# Patient Record
Sex: MALE | Race: BLACK OR AFRICAN AMERICAN | Employment: OTHER | URBAN - METROPOLITAN AREA
[De-identification: names, ages, dates, MRNs, and addresses within clinical notes are randomized per-mention and may not be internally consistent; named-entity substitution may affect disease eponyms.]

---

## 2017-08-16 ENCOUNTER — TELEPHONE (OUTPATIENT)
Dept: CARDIOLOGY CLINIC | Age: 57
End: 2017-08-16

## 2017-08-31 ENCOUNTER — OFFICE VISIT (OUTPATIENT)
Dept: CARDIOLOGY CLINIC | Age: 57
End: 2017-08-31

## 2017-08-31 VITALS
HEIGHT: 72 IN | HEART RATE: 85 BPM | OXYGEN SATURATION: 97 % | SYSTOLIC BLOOD PRESSURE: 148 MMHG | WEIGHT: 315 LBS | DIASTOLIC BLOOD PRESSURE: 94 MMHG | BODY MASS INDEX: 42.66 KG/M2

## 2017-08-31 DIAGNOSIS — I21.4 NSTEMI (NON-ST ELEVATED MYOCARDIAL INFARCTION) (HCC): Primary | ICD-10-CM

## 2017-08-31 DIAGNOSIS — I10 ESSENTIAL HYPERTENSION: ICD-10-CM

## 2017-08-31 DIAGNOSIS — I25.10 CAD IN NATIVE ARTERY: ICD-10-CM

## 2017-08-31 DIAGNOSIS — Z72.0 TOBACCO USE: ICD-10-CM

## 2017-08-31 DIAGNOSIS — E78.5 HYPERLIPIDEMIA LDL GOAL <70: ICD-10-CM

## 2017-08-31 PROCEDURE — 99214 OFFICE O/P EST MOD 30 MIN: CPT | Performed by: NURSE PRACTITIONER

## 2017-08-31 PROCEDURE — G8598 ASA/ANTIPLAT THER USED: HCPCS | Performed by: NURSE PRACTITIONER

## 2017-08-31 PROCEDURE — 3017F COLORECTAL CA SCREEN DOC REV: CPT | Performed by: NURSE PRACTITIONER

## 2017-08-31 PROCEDURE — 4004F PT TOBACCO SCREEN RCVD TLK: CPT | Performed by: NURSE PRACTITIONER

## 2017-08-31 PROCEDURE — G8417 CALC BMI ABV UP PARAM F/U: HCPCS | Performed by: NURSE PRACTITIONER

## 2017-08-31 PROCEDURE — G8427 DOCREV CUR MEDS BY ELIG CLIN: HCPCS | Performed by: NURSE PRACTITIONER

## 2017-08-31 PROCEDURE — 1111F DSCHRG MED/CURRENT MED MERGE: CPT | Performed by: NURSE PRACTITIONER

## 2017-09-01 ENCOUNTER — HOSPITAL ENCOUNTER (OUTPATIENT)
Dept: GENERAL RADIOLOGY | Age: 57
Discharge: HOME | End: 2017-10-12

## 2017-09-01 PROBLEM — I24.9 ACUTE CORONARY SYNDROME (HCC): Status: ACTIVE | Noted: 2017-09-01

## 2017-09-14 ENCOUNTER — TELEPHONE (OUTPATIENT)
Dept: CARDIOLOGY CLINIC | Age: 57
End: 2017-09-14

## 2017-09-15 RX ORDER — CLOPIDOGREL BISULFATE 75 MG/1
75 TABLET ORAL DAILY
Qty: 30 TABLET | Refills: 12 | Status: ON HOLD | OUTPATIENT
Start: 2017-09-15 | End: 2019-05-15

## 2017-10-11 ENCOUNTER — HOSPITAL ENCOUNTER (OUTPATIENT)
Dept: CARDIAC REHAB | Age: 57
Discharge: HOME | End: 2017-10-31
Attending: INTERNAL MEDICINE | Admitting: INTERNAL MEDICINE

## 2017-11-01 ENCOUNTER — HOSPITAL ENCOUNTER (OUTPATIENT)
Dept: OTHER | Age: 57
Discharge: HOME | End: 2017-11-30
Attending: INTERNAL MEDICINE | Admitting: INTERNAL MEDICINE

## 2019-04-02 ENCOUNTER — HOSPITAL ENCOUNTER (EMERGENCY)
Age: 59
Discharge: HOME | End: 2019-04-03
Attending: EMERGENCY MEDICINE
Payer: COMMERCIAL

## 2019-04-02 ENCOUNTER — APPOINTMENT (OUTPATIENT)
Dept: GENERAL RADIOLOGY | Age: 59
End: 2019-04-02
Payer: COMMERCIAL

## 2019-04-02 DIAGNOSIS — R07.9 CHEST PAIN, UNSPECIFIED TYPE: Primary | ICD-10-CM

## 2019-04-02 LAB
HCT VFR BLD CALC: 40.4 % (ref 40.5–52.5)
HEMOGLOBIN: 13.4 G/DL (ref 13.5–17.5)
MCH RBC QN AUTO: 29.1 PG (ref 26–34)
MCHC RBC AUTO-ENTMCNC: 33.1 G/DL (ref 31–36)
MCV RBC AUTO: 87.8 FL (ref 80–100)
PDW BLD-RTO: 15.9 % (ref 12.4–15.4)
PLATELET # BLD: 219 K/UL (ref 135–450)
PMV BLD AUTO: 9.2 FL (ref 5–10.5)
RBC # BLD: 4.6 M/UL (ref 4.2–5.9)
WBC # BLD: 11.1 K/UL (ref 4–11)

## 2019-04-02 PROCEDURE — 93005 ELECTROCARDIOGRAM TRACING: CPT | Performed by: EMERGENCY MEDICINE

## 2019-04-02 PROCEDURE — 85027 COMPLETE CBC AUTOMATED: CPT

## 2019-04-02 PROCEDURE — 83880 ASSAY OF NATRIURETIC PEPTIDE: CPT

## 2019-04-02 PROCEDURE — 36415 COLL VENOUS BLD VENIPUNCTURE: CPT

## 2019-04-02 PROCEDURE — 99285 EMERGENCY DEPT VISIT HI MDM: CPT

## 2019-04-02 PROCEDURE — 84484 ASSAY OF TROPONIN QUANT: CPT

## 2019-04-02 PROCEDURE — 71046 X-RAY EXAM CHEST 2 VIEWS: CPT

## 2019-04-02 PROCEDURE — 80053 COMPREHEN METABOLIC PANEL: CPT

## 2019-04-02 RX ORDER — NITROGLYCERIN 0.4 MG/1
0.4 TABLET SUBLINGUAL EVERY 5 MIN PRN
Status: DISCONTINUED | OUTPATIENT
Start: 2019-04-02 | End: 2019-04-03 | Stop reason: HOSPADM

## 2019-04-02 ASSESSMENT — PAIN SCALES - GENERAL: PAINLEVEL_OUTOF10: 6

## 2019-04-02 ASSESSMENT — PAIN DESCRIPTION - FREQUENCY: FREQUENCY: INTERMITTENT

## 2019-04-02 ASSESSMENT — PAIN DESCRIPTION - ORIENTATION: ORIENTATION: MID

## 2019-04-02 ASSESSMENT — PAIN DESCRIPTION - PAIN TYPE: TYPE: ACUTE PAIN

## 2019-04-02 ASSESSMENT — PAIN DESCRIPTION - DESCRIPTORS: DESCRIPTORS: PRESSURE

## 2019-04-02 ASSESSMENT — PAIN DESCRIPTION - LOCATION: LOCATION: CHEST

## 2019-04-03 VITALS
BODY MASS INDEX: 35.98 KG/M2 | TEMPERATURE: 99.7 F | HEIGHT: 72 IN | OXYGEN SATURATION: 95 % | WEIGHT: 265.65 LBS | DIASTOLIC BLOOD PRESSURE: 100 MMHG | RESPIRATION RATE: 21 BRPM | SYSTOLIC BLOOD PRESSURE: 187 MMHG | HEART RATE: 83 BPM

## 2019-04-03 LAB
A/G RATIO: 1.4 (ref 1.1–2.2)
ALBUMIN SERPL-MCNC: 3.6 G/DL (ref 3.4–5)
ALP BLD-CCNC: 62 U/L (ref 40–129)
ALT SERPL-CCNC: 10 U/L (ref 10–40)
ANION GAP SERPL CALCULATED.3IONS-SCNC: 12 MMOL/L (ref 3–16)
AST SERPL-CCNC: 9 U/L (ref 15–37)
BILIRUB SERPL-MCNC: <0.2 MG/DL (ref 0–1)
BUN BLDV-MCNC: 14 MG/DL (ref 7–20)
CALCIUM SERPL-MCNC: 8.1 MG/DL (ref 8.3–10.6)
CHLORIDE BLD-SCNC: 103 MMOL/L (ref 99–110)
CO2: 21 MMOL/L (ref 21–32)
CREAT SERPL-MCNC: 0.8 MG/DL (ref 0.9–1.3)
EKG ATRIAL RATE: 61 BPM
EKG ATRIAL RATE: 79 BPM
EKG DIAGNOSIS: NORMAL
EKG DIAGNOSIS: NORMAL
EKG P AXIS: 47 DEGREES
EKG P AXIS: 59 DEGREES
EKG P-R INTERVAL: 152 MS
EKG P-R INTERVAL: 160 MS
EKG Q-T INTERVAL: 386 MS
EKG Q-T INTERVAL: 434 MS
EKG QRS DURATION: 74 MS
EKG QRS DURATION: 80 MS
EKG QTC CALCULATION (BAZETT): 436 MS
EKG QTC CALCULATION (BAZETT): 442 MS
EKG R AXIS: -14 DEGREES
EKG R AXIS: -29 DEGREES
EKG T AXIS: 28 DEGREES
EKG T AXIS: 74 DEGREES
EKG VENTRICULAR RATE: 61 BPM
EKG VENTRICULAR RATE: 79 BPM
GFR AFRICAN AMERICAN: >60
GFR NON-AFRICAN AMERICAN: >60
GLOBULIN: 2.6 G/DL
GLUCOSE BLD-MCNC: 266 MG/DL (ref 70–99)
POTASSIUM REFLEX MAGNESIUM: 3.9 MMOL/L (ref 3.5–5.1)
PRO-BNP: 1436 PG/ML (ref 0–124)
SODIUM BLD-SCNC: 136 MMOL/L (ref 136–145)
TOTAL PROTEIN: 6.2 G/DL (ref 6.4–8.2)
TROPONIN: <0.01 NG/ML
TROPONIN: <0.01 NG/ML

## 2019-04-03 PROCEDURE — 93010 ELECTROCARDIOGRAM REPORT: CPT | Performed by: INTERNAL MEDICINE

## 2019-04-03 PROCEDURE — 6370000000 HC RX 637 (ALT 250 FOR IP): Performed by: EMERGENCY MEDICINE

## 2019-04-03 PROCEDURE — 93005 ELECTROCARDIOGRAM TRACING: CPT | Performed by: EMERGENCY MEDICINE

## 2019-04-03 PROCEDURE — 36415 COLL VENOUS BLD VENIPUNCTURE: CPT

## 2019-04-03 PROCEDURE — 84484 ASSAY OF TROPONIN QUANT: CPT

## 2019-04-03 RX ORDER — HYDROCHLOROTHIAZIDE 12.5 MG/1
25 CAPSULE, GELATIN COATED ORAL ONCE
Status: COMPLETED | OUTPATIENT
Start: 2019-04-03 | End: 2019-04-03

## 2019-04-03 RX ORDER — LISINOPRIL 10 MG/1
40 TABLET ORAL ONCE
Status: COMPLETED | OUTPATIENT
Start: 2019-04-03 | End: 2019-04-03

## 2019-04-03 RX ORDER — ISOSORBIDE MONONITRATE 60 MG/1
60 TABLET, EXTENDED RELEASE ORAL ONCE
Status: DISCONTINUED | OUTPATIENT
Start: 2019-04-03 | End: 2019-04-03 | Stop reason: HOSPADM

## 2019-04-03 RX ADMIN — LISINOPRIL 40 MG: 10 TABLET ORAL at 03:44

## 2019-04-03 RX ADMIN — HYDROCHLOROTHIAZIDE 25 MG: 12.5 CAPSULE ORAL at 03:44

## 2019-04-03 ASSESSMENT — HEART SCORE: ECG: 0

## 2019-04-03 ASSESSMENT — PAIN SCALES - GENERAL: PAINLEVEL_OUTOF10: 0

## 2019-04-03 NOTE — ED PROVIDER NOTES
x-ray does not show any pulmonary edema other acute processes. EKG is negative for acute ST segment elevation. She will be kept in the emergency room for repeat of his troponin and EKG. Except on cardiac and blood pressure monitoring. The patient has a run of PVCs which is brief. He is asymptomatic. Repeat troponin and EKG are unremarkable. The patient remains asymptomatic here in the emergency room. The patient will be discharged home at this time. A myocardial stress test is ordered as well patient counseled on the importance of calling today to have this procedure done. Follow-up with [facility] is also given. The patient's blood pressure keeps increasing since his been here in the emergency room he is given his daily dose of antihypertensives this morning in the emergency room prior to discharge. The patient is informed that he should not take his blood pressure medications again for today. Expresses understanding of the instructions he is able to verbalize the instructions back to me. CRITICAL CARE TIME   None       CONSULTS:  None    PROCEDURES:  Unless otherwise noted above, none     Procedures    FINAL IMPRESSION      1. Chest pain, unspecified type          DISPOSITION/PLAN   DISPOSITION        PATIENT REFERREDTO:  [facility] PREETI Me[address]ia[address] [place]  Schedule an appointment as soon as possible for a visit today        DISCHARGEMEDICATIONS:  New Prescriptions    No medications on file          (Please note that portions of this note were completed with a voice recognition program.  Efforts were made to edit the dictations but occasionally words are mis-transcribed.)    Adolfo Moran MD (electronically signed)  Attending Emergency Physician        Adolfo Moran MD  04/03/19 [id number]

## 2019-04-03 NOTE — ED NOTES
Pt states chest pain has resolved. Order for [facility] in place, in case pain returns.       Nico Goldman RN  04/03/19 9993

## 2019-04-03 NOTE — ED TRIAGE NOTES
Pt presents to the ED with c/o chest pain/pressure that started about 9 pm. Pt states he was sitting on the couch when the pain started. Says, \"It feels like it's deep in my chest\". Denies any other complaints. Vital signs are stable. EKG completed.

## 2019-04-09 ENCOUNTER — HOSPITAL ENCOUNTER (EMERGENCY)
Age: 59
Discharge: HOME | End: 2019-04-09
Payer: COMMERCIAL

## 2019-04-09 VITALS
HEIGHT: 72 IN | OXYGEN SATURATION: 100 % | WEIGHT: 266.76 LBS | HEART RATE: 70 BPM | TEMPERATURE: 98.5 F | DIASTOLIC BLOOD PRESSURE: 77 MMHG | SYSTOLIC BLOOD PRESSURE: 169 MMHG | RESPIRATION RATE: 18 BRPM | BODY MASS INDEX: 36.13 KG/M2

## 2019-04-09 DIAGNOSIS — G89.29 ACUTE EXACERBATION OF CHRONIC LOW BACK PAIN: Primary | ICD-10-CM

## 2019-04-09 DIAGNOSIS — M54.50 ACUTE EXACERBATION OF CHRONIC LOW BACK PAIN: Primary | ICD-10-CM

## 2019-04-09 PROCEDURE — 96372 THER/PROPH/DIAG INJ SC/IM: CPT

## 2019-04-09 PROCEDURE — 99283 EMERGENCY DEPT VISIT LOW MDM: CPT

## 2019-04-09 PROCEDURE — 6370000000 HC RX 637 (ALT 250 FOR IP): Performed by: NURSE PRACTITIONER

## 2019-04-09 PROCEDURE — 6360000002 HC RX W HCPCS: Performed by: NURSE PRACTITIONER

## 2019-04-09 RX ORDER — MORPHINE SULFATE 10 MG/ML
6 INJECTION, SOLUTION INTRAMUSCULAR; INTRAVENOUS ONCE
Status: COMPLETED | OUTPATIENT
Start: 2019-04-09 | End: 2019-04-09

## 2019-04-09 RX ORDER — METHOCARBAMOL 500 MG/1
500 TABLET, FILM COATED ORAL 4 TIMES DAILY
Qty: 40 TABLET | Refills: 0 | Status: SHIPPED | OUTPATIENT
Start: 2019-04-09 | End: 2019-04-19

## 2019-04-09 RX ORDER — ORPHENADRINE CITRATE 30 MG/ML
60 INJECTION INTRAMUSCULAR; INTRAVENOUS ONCE
Status: DISCONTINUED | OUTPATIENT
Start: 2019-04-09 | End: 2019-04-09

## 2019-04-09 RX ORDER — METHOCARBAMOL 750 MG/1
750 TABLET, FILM COATED ORAL ONCE
Status: COMPLETED | OUTPATIENT
Start: 2019-04-09 | End: 2019-04-09

## 2019-04-09 RX ORDER — HYDROCODONE BITARTRATE AND ACETAMINOPHEN 5; 325 MG/1; MG/1
1 TABLET ORAL EVERY 8 HOURS PRN
Qty: 6 TABLET | Refills: 0 | Status: SHIPPED | OUTPATIENT
Start: 2019-04-09 | End: 2019-04-11

## 2019-04-09 RX ORDER — LIDOCAINE 50 MG/G
1 PATCH TOPICAL DAILY
Qty: 30 PATCH | Refills: 0 | Status: SHIPPED | OUTPATIENT
Start: 2019-04-09 | End: 2019-10-03

## 2019-04-09 RX ADMIN — MORPHINE SULFATE 6 MG: 10 INJECTION INTRAVENOUS at 21:15

## 2019-04-09 RX ADMIN — METHOCARBAMOL TABLETS 750 MG: 750 TABLET, COATED ORAL at 21:14

## 2019-04-09 ASSESSMENT — PAIN DESCRIPTION - LOCATION
LOCATION: BACK
LOCATION: BACK

## 2019-04-09 ASSESSMENT — ENCOUNTER SYMPTOMS
CHOKING: 0
RHINORRHEA: 0
VOMITING: 0
ABDOMINAL DISTENTION: 0
COUGH: 0
SHORTNESS OF BREATH: 0
COLOR CHANGE: 0
BACK PAIN: 1
CHEST TIGHTNESS: 0
NAUSEA: 0
DIARRHEA: 0

## 2019-04-09 ASSESSMENT — PAIN SCALES - GENERAL
PAINLEVEL_OUTOF10: 4
PAINLEVEL_OUTOF10: 10
PAINLEVEL_OUTOF10: 6

## 2019-04-09 ASSESSMENT — PAIN DESCRIPTION - PAIN TYPE
TYPE: ACUTE PAIN

## 2019-04-09 ASSESSMENT — PAIN DESCRIPTION - ORIENTATION: ORIENTATION: LOWER

## 2019-04-09 ASSESSMENT — PAIN DESCRIPTION - ONSET: ONSET: PROGRESSIVE

## 2019-04-09 ASSESSMENT — PAIN DESCRIPTION - DESCRIPTORS: DESCRIPTORS: SHARP

## 2019-04-10 NOTE — ED NOTES
Pt's daughter notified to  patient, due to narcotic medication administration.      Shelby Miner RN  04/09/19 5659

## 2019-04-10 NOTE — ED TRIAGE NOTES
Pt presents to the ED with c/o sharp lower back pain, that radiates down both legs. Has a history of L5 disk herniation. Pt took Tyelnol, Lyrica, and Gabapentin for pain, which was ineffective. Pt is in tears currently, rating pain a 10/10.

## 2019-04-10 NOTE — ED PROVIDER NOTES
[address] [facility]  eMERGENCY dEPARTMENT eNCOUnter    Pt Name: @@  MRN: [id number]  Birthdate1960  Date of evaluation: 4/9/2019  Provider: ARIEL Richards CNP     Evaluated by the advanced practice provider    [address] [place] [address]       Chief Complaint   Patient presents with    Back Pain     c/o sharp, back pain starting this evening. States pain radiates down both legs         HISTORY OF PRESENT ILLNESS  (Location/Symptom, Timing/Onset, Context/Setting, Quality, Duration, Modifying Factors, Severity.)   Judy López is a 60 y.o. male who presents to the emergencydepartment for low back pain with a history of L5 disc herniation. He states it began this evening while he was sitting watching tv and reached for a remote, he felt a sharp pain in his back that has radiated down the back of bilateral knees. He describes the leg pain as constant, and back pain as sharp, stabbing. Patient states he took gabapentin, lyrica, and tylenol with no relief of symptoms. He states he has had similar pain 8 months ago when the \"disc herniation flared up\" and states it resolved without medical intervention with rest.  Patient states he has seen an orthopedic doctor at [facility] and previously received cortisone injections in his back, the last being over 1 year ago. PMH includes L5 disc herniation, OA of back, diabetes, HTN, and previous CAD requiring stent placement. Patient denies injury, urinary or bowel changes, numbness, or tingling in his lower extremities, chest pain, palpitations, SOB, and cough. Nursing Notes were reviewed and I agree. REVIEW OF SYSTEMS    (2-9 systems for level 4, 10 or more for level 5)     Review of Systems   Constitutional: Positive for activity change. Negative for chills, fatigue and fever. HENT: Negative for ear pain, hearing loss and rhinorrhea. Eyes: Negative for visual disturbance.    Respiratory: Negative for cough, choking, chest tightness and shortness of breath. Cardiovascular: Negative for chest pain and palpitations. Gastrointestinal: Negative for abdominal distention, diarrhea, nausea and vomiting. Endocrine: Negative for polyuria. Genitourinary: Negative for dysuria, flank pain, hematuria and urgency. Musculoskeletal: Positive for arthralgias, back pain and gait problem. Negative for joint swelling, neck pain and neck stiffness. Skin: Negative for color change. Neurological: Negative for dizziness, weakness and headaches. Except as noted above the remainder of the review of systems was reviewed and negative. PAST MEDICAL HISTORY         Diagnosis Date    Arthritis     CAD (coronary artery disease)     MI in 8/2017; BELIA to LCX; 70% residual mid-distal LCX    Chronic pain     Diabetes     Herniated disc     Hyperlipidemia     Hypertension     Morbid obesity ([address]     NSTEMI (non-ST elevated myocardial infarction) ([address]     Protein in urine 2016    Sleep apnea     Type 2 diabetes mellitus without complication ([address]     Wears glasses        SURGICAL HISTORY           Procedure Laterality Date    CORONARY ANGIOPLASTY WITH STENT PLACEMENT  08/2017    BELIA to LCX    EYE SURGERY Bilateral     eye muscle--done as a child    KNEE SURGERY Bilateral     patella repair r/t work related accident       CURRENT MEDICATIONS       Previous Medications    CLOPIDOGREL (PLAVIX) 75 MG TABLET    Take 1 tablet by mouth daily    GLIMEPIRIDE (AMARYL) 2 MG TABLET    Take 2 mg by mouth Daily with supper    GLIPIZIDE (GLUCOTROL) 10 MG TABLET    Take 10 mg by mouth 2 times daily (before meals)    HYDROCHLOROTHIAZIDE (HYDRODIURIL) 12.5 MG TABLET    Take 1 tablet by mouth daily    ISOSORBIDE MONONITRATE (IMDUR) 60 MG EXTENDED RELEASE TABLET    Take 1 tablet by mouth daily    LISINOPRIL (PRINIVIL;ZESTRIL) 40 MG TABLET    Take 40 mg by mouth daily.     METFORMIN (GLUCOPHAGE) 500 MG TABLET    Take 1,000 mg by mouth 2 times daily (with meals) thoracic lumbar spine midline. Elicited pain with palpation in the lumbar spine midline. There is no evidence of swelling, scars, step-offs or crepitance. Pelvic girdle stable. Bilateral lower extremity symmetrical size shape color. Full sensation bilaterally extremities. Patient is able to perform bilateral flexion and extension of the knees and perform bilateral plantar flexion dorsiflexion was strength 5 out of 5 and equal. Bilateral femoral and dorsalis pedis pulses. Good muscle tone. Observe patient ambulate with walker and there is no foot drop and no leg leg. Patient has pain along the left SI region and lumbar paraspinous region. No swelling. Mildly positive left straight leg raise. Neurological: He is alert and oriented to person, place, and time. No cranial nerve deficit or sensory deficit. He exhibits normal muscle tone. Coordination and gait normal.   Reflex Scores:       Patellar reflexes are 2+ on the right side and 2+ on the left side. Achilles reflexes are 2+ on the right side and 2+ on the left side. Skin: Skin is warm and dry. Capillary refill takes less than 2 seconds. He is not diaphoretic. Nursing note and vitals reviewed. DIAGNOSTIC RESULTS     RADIOLOGY:   Non-plain film images such as CT, Ultrasound and MRI are read by the radiologist. Plain radiographic images are visualized and preliminarily interpreted by ARIEL Catalan CNP with the below findings:        Interpretation per the Radiologist below, if available at the time of this note:    No orders to display       LABS:  Labs Reviewed - No data to display    All other labs were within normal range or not returned as of this dictation.     EMERGENCY DEPARTMENT COURSE and DIFFERENTIAL DIAGNOSIS/MDM:   Vitals:    Vitals:    04/09/19 2043   BP: (!) 181/96   Pulse: 76   Resp: 22   Temp: 98.7 °F (37.1 °C)   TempSrc: Oral   SpO2: 100%   Weight: 266 lb 12.1 oz (121 kg)   Height: 6' (1.829 m) MDM    Medications   morphine injection 6 mg (6 mg Intramuscular Given 4/9/19 2115)   methocarbamol (ROBAXIN) tablet 750 mg (750 mg Oral Given 4/9/19 2114)     Patient Was seen and evaluated per myself and PA sofi Hernandez Presume coerce. He has presented ambulatory to the emergency department with no direct trauma. Reporting an exacerbation of midline low back pain after bending over on the couch pick something up. He does ambulate consistently with a walker at home. He is able to do that this evening in the emergency department. Despite being a diabetic he has had no recent procedures. Last spine injection was about a year ago. There is no evidence of cauda equina. I below index of suspicion for discitis given the acute onset nature of his pain this evening. There is no evidence of neurological deficit. No indication that would warrant an emergent MRI or CAT scan. Patient will be treated for pain here in the emergency department. He'll be given prescriptions for Lidoderm. Given his concurrent medical history an antihypertensive therapy NSAIDs should be avoided. He cannot receive steroids due to his diabetic condition. Therefore he will be given muscle relaxants and Lidoderm patches and I will also give him 2 days' worth of narcotic pain medication. [place]AR[place] report reviewed patient has a 000 overdose risk score, he said 3 prescriptions in the past year 3 pharmacies and 3 providers. No red flags. Electronic record indicates MRI of the lumbar spine in 2016 indicates severe degenerative disc changes at L5-S1 with moderate right paracentral central disc herniation at L5-S1, small left paracentral focal disc protrusion at L4-L5. Patient will be discharged home below listed prescriptions with a referral back to follow-up with Dr. Tomeka Vale listed in his record with him he is seen in the past for epidural injections and spine pain management.  He can also follow up with his primary care physician as needed. PROCEDURES:  Procedures    FINAL IMPRESSION      1. Acute exacerbation of chronic low back pain          DISPOSITION/PLAN   DISPOSITION        PATIENT REFERRED TO:  Harsha Galvez MD    Schedule an appointment as soon as possible for a visit       Stefani Mcpherson MD  [address]     Schedule an appointment as soon as possible for a visit   Spine, pain management referral: This is who is documented in her record that you have seen in the past for your spine related issues      DISCHARGE MEDICATIONS:  New Prescriptions    HYDROCODONE-ACETAMINOPHEN (NORCO) 5-325 MG PER TABLET    Take 1 tablet by mouth every 8 hours as needed for Pain for up to 2 days. Sedation precautions please    LIDOCAINE (LIDODERM) 5 %    Place 1 patch onto the skin daily 12 hours on, 12 hours off.     METHOCARBAMOL (ROBAXIN) 500 MG TABLET    Take 1 tablet by mouth 4 times daily for 10 days       (Please note that portions of this note werecompleted with a voice recognition program.  Efforts were made to edit the dictations but occasionally words are mis-transcribed.)    Anitra Brasher, [facility], APRN - CNP  04/09/19 [id number]

## 2019-04-12 ENCOUNTER — APPOINTMENT (OUTPATIENT)
Dept: CT IMAGING | Age: 59
End: 2019-04-12
Payer: COMMERCIAL

## 2019-04-12 ENCOUNTER — HOSPITAL ENCOUNTER (EMERGENCY)
Age: 59
Discharge: HOME | End: 2019-04-12
Attending: EMERGENCY MEDICINE
Payer: COMMERCIAL

## 2019-04-12 VITALS
SYSTOLIC BLOOD PRESSURE: 172 MMHG | HEART RATE: 76 BPM | WEIGHT: 266 LBS | DIASTOLIC BLOOD PRESSURE: 95 MMHG | RESPIRATION RATE: 18 BRPM | HEIGHT: 72 IN | BODY MASS INDEX: 36.03 KG/M2 | TEMPERATURE: 98.4 F | OXYGEN SATURATION: 95 %

## 2019-04-12 DIAGNOSIS — M54.50 ACUTE EXACERBATION OF CHRONIC LOW BACK PAIN: Primary | ICD-10-CM

## 2019-04-12 DIAGNOSIS — G89.29 ACUTE EXACERBATION OF CHRONIC LOW BACK PAIN: Primary | ICD-10-CM

## 2019-04-12 LAB
ANION GAP SERPL CALCULATED.3IONS-SCNC: 11 MMOL/L (ref 3–16)
BASOPHILS ABSOLUTE: 0.1 K/UL (ref 0–0.2)
BASOPHILS RELATIVE PERCENT: 1.4 %
BILIRUBIN URINE: NEGATIVE
BLOOD, URINE: NEGATIVE
BUN BLDV-MCNC: 15 MG/DL (ref 7–20)
CALCIUM SERPL-MCNC: 9.1 MG/DL (ref 8.3–10.6)
CHLORIDE BLD-SCNC: 103 MMOL/L (ref 99–110)
CLARITY: CLEAR
CO2: 25 MMOL/L (ref 21–32)
COLOR: YELLOW
CREAT SERPL-MCNC: 1 MG/DL (ref 0.9–1.3)
EOSINOPHILS ABSOLUTE: 0.1 K/UL (ref 0–0.6)
EOSINOPHILS RELATIVE PERCENT: 1.4 %
EPITHELIAL CELLS, UA: 0 /HPF (ref 0–5)
GFR AFRICAN AMERICAN: >60
GFR NON-AFRICAN AMERICAN: >60
GLUCOSE BLD-MCNC: 225 MG/DL (ref 70–99)
GLUCOSE URINE: 250 MG/DL
HCT VFR BLD CALC: 44.6 % (ref 40.5–52.5)
HEMOGLOBIN: 14.9 G/DL (ref 13.5–17.5)
HYALINE CASTS: 1 /LPF (ref 0–8)
KETONES, URINE: NEGATIVE MG/DL
LEUKOCYTE ESTERASE, URINE: NEGATIVE
LYMPHOCYTES ABSOLUTE: 3.3 K/UL (ref 1–5.1)
LYMPHOCYTES RELATIVE PERCENT: 31.1 %
MCH RBC QN AUTO: 29.3 PG (ref 26–34)
MCHC RBC AUTO-ENTMCNC: 33.5 G/DL (ref 31–36)
MCV RBC AUTO: 87.4 FL (ref 80–100)
MICROSCOPIC EXAMINATION: YES
MONOCYTES ABSOLUTE: 0.9 K/UL (ref 0–1.3)
MONOCYTES RELATIVE PERCENT: 8.8 %
NEUTROPHILS ABSOLUTE: 6 K/UL (ref 1.7–7.7)
NEUTROPHILS RELATIVE PERCENT: 57.3 %
NITRITE, URINE: NEGATIVE
PDW BLD-RTO: 16 % (ref 12.4–15.4)
PH UA: 5.5 (ref 5–8)
PLATELET # BLD: 224 K/UL (ref 135–450)
PMV BLD AUTO: 9.3 FL (ref 5–10.5)
POTASSIUM REFLEX MAGNESIUM: 3.9 MMOL/L (ref 3.5–5.1)
PROTEIN UA: 30 MG/DL
RBC # BLD: 5.1 M/UL (ref 4.2–5.9)
RBC UA: 1 /HPF (ref 0–4)
SODIUM BLD-SCNC: 139 MMOL/L (ref 136–145)
SPECIFIC GRAVITY UA: 1.02 (ref 1–1.03)
URINE REFLEX TO CULTURE: ABNORMAL
URINE TYPE: ABNORMAL
UROBILINOGEN, URINE: 1 E.U./DL
WBC # BLD: 10.5 K/UL (ref 4–11)
WBC UA: 0 /HPF (ref 0–5)

## 2019-04-12 PROCEDURE — 6360000002 HC RX W HCPCS: Performed by: EMERGENCY MEDICINE

## 2019-04-12 PROCEDURE — 96375 TX/PRO/DX INJ NEW DRUG ADDON: CPT

## 2019-04-12 PROCEDURE — 85025 COMPLETE CBC W/AUTO DIFF WBC: CPT

## 2019-04-12 PROCEDURE — 74177 CT ABD & PELVIS W/CONTRAST: CPT

## 2019-04-12 PROCEDURE — 6370000000 HC RX 637 (ALT 250 FOR IP): Performed by: EMERGENCY MEDICINE

## 2019-04-12 PROCEDURE — 99284 EMERGENCY DEPT VISIT MOD MDM: CPT

## 2019-04-12 PROCEDURE — 81001 URINALYSIS AUTO W/SCOPE: CPT

## 2019-04-12 PROCEDURE — 80048 BASIC METABOLIC PNL TOTAL CA: CPT

## 2019-04-12 PROCEDURE — 96374 THER/PROPH/DIAG INJ IV PUSH: CPT

## 2019-04-12 PROCEDURE — 6360000004 HC RX CONTRAST MEDICATION: Performed by: EMERGENCY MEDICINE

## 2019-04-12 PROCEDURE — 72131 CT LUMBAR SPINE W/O DYE: CPT

## 2019-04-12 RX ORDER — PREDNISONE 20 MG/1
40 TABLET ORAL DAILY
Qty: 6 TABLET | Refills: 0 | Status: SHIPPED | OUTPATIENT
Start: 2019-04-12 | End: 2019-04-15

## 2019-04-12 RX ORDER — KETOROLAC TROMETHAMINE 30 MG/ML
15 INJECTION, SOLUTION INTRAMUSCULAR; INTRAVENOUS ONCE
Status: COMPLETED | OUTPATIENT
Start: 2019-04-12 | End: 2019-04-12

## 2019-04-12 RX ORDER — OXYCODONE HYDROCHLORIDE AND ACETAMINOPHEN 5; 325 MG/1; MG/1
1 TABLET ORAL EVERY 6 HOURS PRN
Qty: 20 TABLET | Refills: 0 | Status: SHIPPED | OUTPATIENT
Start: 2019-04-12 | End: 2019-04-17

## 2019-04-12 RX ORDER — METHYLPREDNISOLONE SODIUM SUCCINATE 125 MG/2ML
125 INJECTION, POWDER, LYOPHILIZED, FOR SOLUTION INTRAMUSCULAR; INTRAVENOUS ONCE
Status: COMPLETED | OUTPATIENT
Start: 2019-04-12 | End: 2019-04-12

## 2019-04-12 RX ORDER — OXYCODONE HYDROCHLORIDE AND ACETAMINOPHEN 5; 325 MG/1; MG/1
1 TABLET ORAL ONCE
Status: COMPLETED | OUTPATIENT
Start: 2019-04-12 | End: 2019-04-12

## 2019-04-12 RX ADMIN — IOPAMIDOL 75 ML: 755 INJECTION, SOLUTION INTRAVENOUS at 02:48

## 2019-04-12 RX ADMIN — KETOROLAC TROMETHAMINE 15 MG: 30 INJECTION, SOLUTION INTRAMUSCULAR; INTRAVENOUS at 01:35

## 2019-04-12 RX ADMIN — METHYLPREDNISOLONE SODIUM SUCCINATE 125 MG: 125 INJECTION, POWDER, FOR SOLUTION INTRAMUSCULAR; INTRAVENOUS at 04:08

## 2019-04-12 RX ADMIN — OXYCODONE HYDROCHLORIDE AND ACETAMINOPHEN 1 TABLET: 5; 325 TABLET ORAL at 01:35

## 2019-04-12 ASSESSMENT — PAIN DESCRIPTION - PAIN TYPE
TYPE: ACUTE PAIN
TYPE: ACUTE PAIN

## 2019-04-12 ASSESSMENT — ENCOUNTER SYMPTOMS
SORE THROAT: 0
COUGH: 0
NAUSEA: 0
EYE DISCHARGE: 0
EYE REDNESS: 0
VOMITING: 0
ABDOMINAL PAIN: 0
RHINORRHEA: 0
BACK PAIN: 1
WHEEZING: 0
SHORTNESS OF BREATH: 0
DIARRHEA: 0
EYE PAIN: 0

## 2019-04-12 ASSESSMENT — PAIN DESCRIPTION - DESCRIPTORS: DESCRIPTORS: SHARP;SHOOTING

## 2019-04-12 ASSESSMENT — PAIN SCALES - GENERAL
PAINLEVEL_OUTOF10: 9
PAINLEVEL_OUTOF10: 9
PAINLEVEL_OUTOF10: 5

## 2019-04-12 ASSESSMENT — PAIN - FUNCTIONAL ASSESSMENT: PAIN_FUNCTIONAL_ASSESSMENT: PREVENTS OR INTERFERES SOME ACTIVE ACTIVITIES AND ADLS

## 2019-04-12 ASSESSMENT — PAIN DESCRIPTION - FREQUENCY: FREQUENCY: CONTINUOUS

## 2019-04-12 ASSESSMENT — PAIN DESCRIPTION - PROGRESSION: CLINICAL_PROGRESSION: RAPIDLY WORSENING

## 2019-04-12 ASSESSMENT — PAIN DESCRIPTION - ONSET: ONSET: PROGRESSIVE

## 2019-04-12 ASSESSMENT — PAIN DESCRIPTION - LOCATION
LOCATION: BACK
LOCATION: BACK

## 2019-04-12 ASSESSMENT — PAIN DESCRIPTION - ORIENTATION
ORIENTATION: LOWER
ORIENTATION: LOWER

## 2019-04-12 NOTE — ED TRIAGE NOTES
Pt c/o exacerbation of lower back pain after reaching for a TV remote while in a sitting position. He has taken his prescribed gabapentin without symptom relief.  He reports that the pain radiates into both of his legs

## 2019-04-12 NOTE — ED NOTES
Missed IV attempt x3.  Charge nurse notified and she will be in to place IV access      Yu Enrique RN  04/12/19 7784

## 2019-04-12 NOTE — ED PROVIDER NOTES
[facility]  eMERGENCY dEPARTMENT eNCOUnter        Pt Name: Loren Juárez  MRN: [id number]  [place] 1960  Date of evaluation: 4/12/2019  Provider: Nasrin Cortes MD  PCP: Ilia Ngo MD      [address] [place] [address]       Chief Complaint   Patient presents with    Back Pain     lower back started today       HISTORY OFPRESENT ILLNESS   (Location/Symptom, Timing/Onset, Context/Setting, Quality, Duration, Modifying Factors,Severity)  Note limiting factors. Loren Juárez is a 60 y.o. male       Location/Symptom: Low back pain  Timing/Onset: Patient does have a history of chronic back problems and disc problems. He has dealt with this for years. Although he has chronic pain he really is not on narcotics on any kind of regular basis. Context/Setting: He was seen at the [address] [place] [address] emergency department 3 days ago. While there he was given 6 mg of morphine. He states it made him feel horrible. Made him too sleepy and feel like a drug addict and that he does not want that again. They gave him lidocaine patches but his insurance would not cover it. He was given 6 tablets of Norco which he has used up. That helped but not quite enough. It has gotten much worse today. He normally walks with a cane. He is also diabetic. He does suffer from episodes of urinary incontinence but this is somewhat of a chronic issue. Quality: Sharp  Duration: Constant  Modifying Factors: This pain gets worse when he lies down. Severity: 9 out of 10    Nursing Noteswere all reviewed and agreed with or any disagreements were addressed  in the HPI. REVIEW OF SYSTEMS    (2-9 systems for level 4, 10 or more for level 5)     Review of Systems   Constitutional: Negative for chills, fatigue and fever. HENT: Negative for ear pain, rhinorrhea and sore throat. Eyes: Negative for pain, discharge, redness and visual disturbance.    Respiratory: Negative for cough, shortness of breath and wheezing. Cardiovascular: Negative for chest pain, palpitations and leg swelling. Gastrointestinal: Negative for abdominal pain, diarrhea, nausea and vomiting. Genitourinary: Negative for difficulty urinating and dysuria. Musculoskeletal: Positive for back pain and gait problem. Negative for arthralgias and myalgias. Skin: Negative for rash. Allergic/Immunologic: Negative for environmental allergies. Neurological: Negative for dizziness, seizures, syncope and headaches. Hematological: Negative for adenopathy. Psychiatric/Behavioral: Negative for suicidal ideas. The patient is not nervous/anxious. PAST MEDICAL HISTORY     Past Medical History:   Diagnosis Date    Arthritis     CAD (coronary artery disease)     MI in 8/2017; BELIA to LCX; 70% residual mid-distal LCX    Chronic pain     Diabetes     Herniated disc     Hyperlipidemia     Hypertension     Morbid obesity ([address]     NSTEMI (non-ST elevated myocardial infarction) ([address]     Protein in urine 2016    Sleep apnea     Type 2 diabetes mellitus without complication ([address]     Wears glasses          SURGICAL HISTORY     Past Surgical History:   Procedure Laterality Date    CORONARY ANGIOPLASTY WITH STENT PLACEMENT  08/2017    BELIA to LCX    EYE SURGERY Bilateral     eye muscle--done as a child    KNEE SURGERY Bilateral     patella repair r/t work related accident         CURRENTMEDICATIONS       Previous Medications    CLOPIDOGREL (PLAVIX) 75 MG TABLET    Take 1 tablet by mouth daily    GLIMEPIRIDE (AMARYL) 2 MG TABLET    Take 2 mg by mouth Daily with supper    GLIPIZIDE (GLUCOTROL) 10 MG TABLET    Take 10 mg by mouth 2 times daily (before meals)    HYDROCHLOROTHIAZIDE (HYDRODIURIL) 12.5 MG TABLET    Take 1 tablet by mouth daily    ISOSORBIDE MONONITRATE (IMDUR) 60 MG EXTENDED RELEASE TABLET    Take 1 tablet by mouth daily    LIDOCAINE (LIDODERM) 5 %    Place 1 patch onto the skin daily 12 hours on, 12 hours off. LISINOPRIL (PRINIVIL;ZESTRIL) 40 MG TABLET    Take 40 mg by mouth daily. METFORMIN (GLUCOPHAGE) 500 MG TABLET    Take 1,000 mg by mouth 2 times daily (with meals)     METHOCARBAMOL (ROBAXIN) 500 MG TABLET    Take 1 tablet by mouth 4 times daily for 10 days    NITROGLYCERIN (NITROSTAT) 0.4 MG SL TABLET    Place 1 tablet under the tongue every 5 minutes as needed for Chest pain up to max of 3 total doses. If no relief after 1 dose, call 911. ALLERGIES     Z-debbie [azithromycin]; Adalat [nifedipine]; Canagliflozin; Penicillins;  Exenatide; and Meloxicam    FAMILY HISTORY       Family History   Problem Relation Age of Onset   [facility] Cancer Mother     Cancer Other     Diabetes Other           SOCIAL HISTORY       Social History     Socioeconomic History    Marital status:      Spouse name: Not on file    Number of children: Not on file    Years of education: Not on file    Highest education level: Not on file   Occupational History    Occupation: Disability/Retirment   Social Needs    Financial resource strain: Not on file    Food insecurity:     Worry: Not on file     Inability: Not on file    Transportation needs:     Medical: Not on file     Non-medical: Not on file   Tobacco Use    Smoking status: Current Every Day Smoker     Packs/day: 1.00     Types: Cigarettes    Tobacco comment: Trying to cut down   Substance and Sexual Activity    Alcohol use: No    Drug use: No    Sexual activity: Not Currently     Comment:    Lifestyle    Physical activity:     Days per week: Not on file     Minutes per session: Not on file    Stress: Not on file   Relationships    Social connections:     Talks on phone: Not on file     Gets together: Not on file     Attends Zoroastrian service: Not on file     Active member of club or organization: Not on file     Attends meetings of clubs or organizations: Not on file     Relationship status: Not on file    Intimate partner violence:     Fear of current or ex partner: Not on file     Emotionally abused: Not on file     Physically abused: Not on file     Forced sexual activity: Not on file   Other Topics Concern    Not on file   Social History Narrative    Not on file       SCREENINGS             PHYSICAL EXAM    (up to 7 for level 4, 8 or more for level 5)     ED Triage Vitals [04/12/19 0104]   BP Temp Temp Source Pulse Resp SpO2 Height Weight   (!) 202/117 98.4 °F (36.9 °C) Oral 76 18 95 % 6' (1.829 m) 266 lb (120.7 kg)      height is 6' (1.829 m) and weight is 266 lb (120.7 kg). His oral temperature is 98.4 °F (36.9 °C). His blood pressure is 172/95 (abnormal) and his pulse is 76. His respiration is 18 and oxygen saturation is 95%. Physical Exam   Constitutional: He is oriented to person, place, and time. He appears well-developed and well-nourished. HENT:   Head: Normocephalic and atraumatic. Right Ear: External ear normal.   Left Ear: External ear normal.   Eyes: Right eye exhibits no discharge. Left eye exhibits no discharge. No scleral icterus. Neck: Normal range of motion. No JVD present. No tracheal deviation present. No thyromegaly present. Cardiovascular: Normal rate and regular rhythm. Exam reveals no gallop and no friction rub. No murmur heard. Pulmonary/Chest: Effort normal and breath sounds normal. No stridor. No respiratory distress. He has no wheezes. He has no rales. Abdominal: Soft. He exhibits no distension. There is tenderness in the right lower quadrant. There is no rigidity, no rebound, no guarding, no tenderness at McBurney's point and negative Ayers's sign. Musculoskeletal: He exhibits no edema or tenderness. Patient is tender both in the lower midline lumbosacral junction as well as the sacroiliac joint bilaterally. I laid this patient flat to examine his abdomen and the pain became excruciating. He started crying in pain and asked to sit up. Neurological: He is alert and oriented to person, place, and time. Coordination normal.   Skin: Skin is warm and dry. No rash (On exposed body surfaces) noted. He is not diaphoretic. Psychiatric: He has a normal mood and affect.  His behavior is normal. Thought content normal.       DIAGNOSTIC RESULTS   LABS:    Results for orders placed or performed during the hospital encounter of 04/12/19   CBC Auto Differential   Result Value Ref Range    WBC 10.5 4.0 - 11.0 K/uL    RBC 5.10 4.20 - 5.90 M/uL    Hemoglobin 14.9 13.5 - 17.5 g/dL    Hematocrit 44.6 40.5 - 52.5 %    MCV 87.4 80.0 - 100.0 fL    MCH 29.3 26.0 - 34.0 pg    MCHC 33.5 31.0 - 36.0 g/dL    RDW 16.0 (H) 12.4 - 15.4 %    Platelets 431 184 - 919 K/uL    MPV 9.3 5.0 - 10.5 fL    Neutrophils % 57.3 %    Lymphocytes % 31.1 %    Monocytes % 8.8 %    Eosinophils % 1.4 %    Basophils % 1.4 %    Neutrophils # 6.0 1.7 - 7.7 K/uL    Lymphocytes # 3.3 1.0 - 5.1 K/uL    Monocytes # 0.9 0.0 - 1.3 K/uL    Eosinophils # 0.1 0.0 - 0.6 K/uL    Basophils # 0.1 0.0 - 0.2 K/uL   Basic Metabolic Panel w/ Reflex to MG   Result Value Ref Range    Sodium 139 136 - 145 mmol/L    Potassium reflex Magnesium 3.9 3.5 - 5.1 mmol/L    Chloride 103 99 - 110 mmol/L    CO2 25 21 - 32 mmol/L    Anion Gap 11 3 - 16    Glucose 225 (H) 70 - 99 mg/dL    BUN 15 7 - 20 mg/dL    CREATININE 1.0 0.9 - 1.3 mg/dL    GFR Non-African American >60 >60    GFR African American >60 >60    Calcium 9.1 8.3 - 10.6 mg/dL   Urinalysis Reflex to Culture   Result Value Ref Range    Color, UA YELLOW Straw/Yellow    Clarity, UA Clear Clear    Glucose, Ur 250 (A) Negative mg/dL    Bilirubin Urine Negative Negative    Ketones, Urine Negative Negative mg/dL    Specific Gravity, UA 1.019 1.005 - 1.030    Blood, Urine Negative Negative    pH, UA 5.5 5.0 - 8.0    Protein, UA 30 (A) Negative mg/dL    Urobilinogen, Urine 1.0 <2.0 E.U./dL    Nitrite, Urine Negative Negative    Leukocyte Esterase, Urine Negative Negative    Microscopic Examination YES     Urine Reflex to Culture Not Indicated Urine Type Not Specified    Microscopic Urinalysis   Result Value Ref Range    Hyaline Casts, UA 1 0 - 8 /LPF    WBC, UA 0 0 - 5 /HPF    RBC, UA 1 0 - 4 /HPF    Epi Cells 0 0 - 5 /HPF       All other labs were within normal range or not returned as of this dictation. EKG: All EKG's are interpreted by the Emergency Department Physician who either signs orCo-signs this chart in the absence of a cardiologist.    None    RADIOLOGY:   Non-plain film images such as CT, Ultrasound and MRI are read by the radiologist. Mesfin Liang radiographic images are visualized and preliminarily interpreted by the  EDProvider with the below findings:    Ct Lumbar Spine Wo Contrast    Result Date: 4/12/2019  EXAMINATION: CT OF THE ABDOMEN AND PELVIS WITH CONTRAST; CT OF THE LUMBAR SPINE WITHOUT CONTRAST 4/12/2019 2:40 am TECHNIQUE: CT of the abdomen and pelvis was performed with the administration of intravenous contrast. Multiplanar reformatted images are provided for review. Dose modulation, iterative reconstruction, and/or weight based adjustment of the mA/kV was utilized to reduce the radiation dose to as low as reasonably achievable.; CT of the lumbar spine was performed without the administration of intravenous contrast. Multiplanar reformatted images are provided for review. Dose modulation, iterative reconstruction, and/or weight based adjustment of the mA/kV was utilized to reduce the radiation dose to as low as reasonably achievable. COMPARISON: 06/29/2013 HISTORY: ORDERING SYSTEM PROVIDED HISTORY: back pain TECHNOLOGIST PROVIDED HISTORY: Additional Contrast?->None Ordering Physician Provided Reason for Exam: back pain Acuity: Acute FINDINGS: Lower Chest: There is bibasilar atelectasis. Organs: The liver, spleen, pancreas, gallbladder, and adrenal glands are unremarkable. There is either bilateral hydronephrosis or there are bilateral renal sinus cysts. The ureters are not dilated and there are no calculi.  GI/Bowel: Small HISTORY: back pain TECHNOLOGIST PROVIDED HISTORY: Additional Contrast?->None Ordering Physician Provided Reason for Exam: back pain Acuity: Acute FINDINGS: Lower Chest: There is bibasilar atelectasis. Organs: The liver, spleen, pancreas, gallbladder, and adrenal glands are unremarkable. There is either bilateral hydronephrosis or there are bilateral renal sinus cysts. The ureters are not dilated and there are no calculi. GI/Bowel: Small bowel caliber is normal.  The appendix is not identified. The colon is unremarkable. Pelvis: The bladder is grossly negative. Peritoneum/Retroperitoneum: No adenopathy, mesenteric stranding or free fluid. Aortic caliber is normal. Bones/Soft Tissues: Evaluation of the lumbar spine demonstrates a transitional vertebral body. For the purposes of this report the lowest most disc space will be considered L5-S1. Vertebral body heights are preserved without evidence for fracture. There is minimal retrolisthesis at L5-S1. Alignment is otherwise normal.  There is mild disc space narrowing at L4-L5 with vacuum disc phenomenon. L5-S1 demonstrates severe disc space narrowing and vacuum disc phenomenon. There is diffuse endplate spurring and facet arthropathy. 1. Bilateral hydronephrosis versus renal sinus cysts. The ureters are not dilated and no calculi are seen. 2. Transitional lumbar vertebral body which can be a source of chronic pain. There is multilevel spondylosis most pronounced at L5-S1. No acute fracture is identified.            PROCEDURES   Unless otherwise noted below, none     Procedures    CRITICAL CARE TIME   N/A    CONSULTS:  None    EMERGENCY DEPARTMENT COURSE and DIFFERENTIAL DIAGNOSIS/MDM:   Vitals:    Vitals:    04/12/19 0104 04/12/19 0115   BP: (!) 202/117 (!) 172/95   Pulse: 76    Resp: 18    Temp: 98.4 °F (36.9 °C)    TempSrc: Oral    SpO2: 95%    Weight: 266 lb (120.7 kg)    Height: 6' (1.829 m)        Patient was given the following medications:  Medications   ketorolac (TORADOL) injection 15 mg (15 mg Intravenous Given 4/12/19 0135)   oxyCODONE-acetaminophen (PERCOCET) 5-325 MG per tablet 1 tablet (1 tablet Oral Given 4/12/19 0135)   iopamidol (ISOVUE-370) 76 % injection 75 mL (75 mLs Intravenous Given 4/12/19 0248)   methylPREDNISolone sodium (SOLU-MEDROL) injection 125 mg (125 mg Intravenous Given 4/12/19 0408)       He is stable. I do not have concerns that this patient is drug seeking or has an overuse syndrome. I think he may indeed need another MRI and decision made about repeat evaluation for surgery. He does not, however, meet criteria for inpatient admission. He is doing better with the medications administered here. I think he would benefit from some steroids because he does have some radicular symptoms. Of course being diabetic create issues some going to put him on prednisone for just 3 days. I am hopeful that this will at least calm down his symptoms enough to see his primary care provider and see about having another MRI. FINAL IMPRESSION      1. Acute exacerbation of chronic low back pain          DISPOSITION/PLAN    DISPOSITION Decision To Discharge 04/12/2019 04:18:21 AM      PATIENT REFERRED TO:  Veronika Brown MD      Please get in to see her as soon as possible      DISCHARGE MEDICATIONS:  New Prescriptions    OXYCODONE-ACETAMINOPHEN (PERCOCET) 5-325 MG PER TABLET    Take 1 tablet by mouth every 6 hours as needed for Pain for up to 5 days. Intended supply: 5 days. Take lowest dose possible to manage pain    PREDNISONE (DELTASONE) 20 MG TABLET    Take 2 tablets by mouth daily for 3 days       DISCONTINUED MEDICATIONS:  Discontinued Medications    No medications on file         Attestation: The Prescription Monitoring Report for this patient was reviewed today.  Mar Gallego MD)    (Please note that portions of this note were completed with a voice recognition program.  Efforts were made to amaury

## 2019-04-15 ENCOUNTER — HOSPITAL ENCOUNTER (EMERGENCY)
Age: 59
Discharge: HOME | End: 2019-04-15
Attending: EMERGENCY MEDICINE
Payer: COMMERCIAL

## 2019-04-15 VITALS
SYSTOLIC BLOOD PRESSURE: 169 MMHG | RESPIRATION RATE: 18 BRPM | WEIGHT: 267.86 LBS | OXYGEN SATURATION: 96 % | HEIGHT: 72 IN | TEMPERATURE: 99 F | BODY MASS INDEX: 36.28 KG/M2 | HEART RATE: 75 BPM | DIASTOLIC BLOOD PRESSURE: 94 MMHG

## 2019-04-15 DIAGNOSIS — G89.29 ACUTE EXACERBATION OF CHRONIC LOW BACK PAIN: Primary | ICD-10-CM

## 2019-04-15 DIAGNOSIS — M54.50 ACUTE EXACERBATION OF CHRONIC LOW BACK PAIN: Primary | ICD-10-CM

## 2019-04-15 PROCEDURE — 6370000000 HC RX 637 (ALT 250 FOR IP): Performed by: EMERGENCY MEDICINE

## 2019-04-15 PROCEDURE — 99283 EMERGENCY DEPT VISIT LOW MDM: CPT

## 2019-04-15 PROCEDURE — 6360000002 HC RX W HCPCS: Performed by: EMERGENCY MEDICINE

## 2019-04-15 PROCEDURE — 96372 THER/PROPH/DIAG INJ SC/IM: CPT

## 2019-04-15 RX ORDER — LISINOPRIL 10 MG/1
40 TABLET ORAL ONCE
Status: COMPLETED | OUTPATIENT
Start: 2019-04-15 | End: 2019-04-15

## 2019-04-15 RX ORDER — MORPHINE SULFATE 10 MG/ML
4 INJECTION, SOLUTION INTRAMUSCULAR; INTRAVENOUS ONCE
Status: COMPLETED | OUTPATIENT
Start: 2019-04-15 | End: 2019-04-15

## 2019-04-15 RX ORDER — HYDROCHLOROTHIAZIDE 12.5 MG/1
12.5 CAPSULE, GELATIN COATED ORAL ONCE
Status: COMPLETED | OUTPATIENT
Start: 2019-04-15 | End: 2019-04-15

## 2019-04-15 RX ORDER — ISOSORBIDE MONONITRATE 60 MG/1
60 TABLET, EXTENDED RELEASE ORAL DAILY
Status: DISCONTINUED | OUTPATIENT
Start: 2019-04-16 | End: 2019-04-16 | Stop reason: HOSPADM

## 2019-04-15 RX ADMIN — HYDROCHLOROTHIAZIDE 12.5 MG: 12.5 CAPSULE ORAL at 22:00

## 2019-04-15 RX ADMIN — LISINOPRIL 40 MG: 10 TABLET ORAL at 22:00

## 2019-04-15 RX ADMIN — MORPHINE SULFATE 4 MG: 10 INJECTION INTRAVENOUS at 21:48

## 2019-04-15 ASSESSMENT — PAIN DESCRIPTION - PROGRESSION: CLINICAL_PROGRESSION: GRADUALLY WORSENING

## 2019-04-15 ASSESSMENT — PAIN - FUNCTIONAL ASSESSMENT
PAIN_FUNCTIONAL_ASSESSMENT: 0-10
PAIN_FUNCTIONAL_ASSESSMENT: PREVENTS OR INTERFERES SOME ACTIVE ACTIVITIES AND ADLS

## 2019-04-15 ASSESSMENT — PAIN SCALES - GENERAL
PAINLEVEL_OUTOF10: 5
PAINLEVEL_OUTOF10: 5
PAINLEVEL_OUTOF10: 8
PAINLEVEL_OUTOF10: 8

## 2019-04-15 ASSESSMENT — PAIN DESCRIPTION - ONSET: ONSET: SUDDEN

## 2019-04-15 ASSESSMENT — PAIN DESCRIPTION - DESCRIPTORS: DESCRIPTORS: STABBING

## 2019-04-15 ASSESSMENT — PAIN DESCRIPTION - DIRECTION: RADIATING_TOWARDS: RADIATING DOWN LEFT LEG

## 2019-04-15 ASSESSMENT — PAIN DESCRIPTION - FREQUENCY: FREQUENCY: CONTINUOUS

## 2019-04-15 ASSESSMENT — PAIN DESCRIPTION - LOCATION: LOCATION: BACK;HIP

## 2019-04-15 ASSESSMENT — PAIN DESCRIPTION - ORIENTATION: ORIENTATION: LEFT;LOWER

## 2019-04-16 NOTE — ED NOTES
Imdur not available at this facility. MD made aware. No new orders given.        Mickie Bates RN  04/15/19 [id number]

## 2019-04-16 NOTE — ED NOTES
Patient has poor hygiene and smells as if he has not bathed in some time.       Enid Hampton RN  04/15/19 1460

## 2019-04-16 NOTE — ED NOTES
Fall risk screening completed. Fall risk bracelet applied to patient. Non-skid socks provided and placed on patient. The fall risk is indicated using  fall sign . Based on score, a bed alarm was indicated and applied. The call light is within the patient's reach, and instructions for use were provided. The bed is in the lowest position with wheels locked. The patient has been advised to notify staff, using the call light, if there is a need to get up or use restroom. The patient verbalized understanding of safety precautions and how to contact staff for assistance.       Rafita Whitehead RN  04/15/19 2585

## 2019-04-16 NOTE — ED PROVIDER NOTES
[id number] [facility]  eMERGENCY dEPARTMENT eNCOUnter      Pt Name: Feliciano Strong  MRN: [id number]  [place] 1960  Date of evaluation: 4/15/2019  Provider: Leda Franco DO    CHIEF COMPLAINT       Chief Complaint   Patient presents with    Back Pain     radiating down left leg x 2 days. Was here last week for same. Treated and got better, but back again.  Hip Pain         HISTORY OF PRESENT ILLNESS   (Location/Symptom, Timing/Onset, Context/Setting, Quality, Duration, Modifying Factors, Severity)  Note limiting factors. Feliciano Strong is a 60 y.o. male who presents to the emergency department with complaint of back pain. Patient reports that he got injured in an accident work lifting heavy boxes at the beginning of April. He was seen here on April 9 and was discharged home with pain medications. He was seen again on April 12 and had a CT of his lumbar spine as well as his abdomen showing bilateral hydronephrosis and some transitional lumbar vertebral body which could be a source of chronic pain. Also spondylosis at L5-S1. No fracture was identified. Patient reports that she's been home he has intermittently been taking his medications. States the pain is worse with ambulation and radiates down his left leg. Denies any bowel or bladder, etc. retention, lower extremity numbness weakness or tingling, perineal anesthesia. Still is able to ambulate with his walker. Was able to ambulate to the taxi to get here. Has not taken his blood pressure medication this morning. Denies any new trauma to his back. Denies chest pain, shortness of breath, abdominal pain, headache. Nursing Notes were reviewed.       PAST MEDICAL HISTORY     Past Medical History:   Diagnosis Date    Arthritis     CAD (coronary artery disease)     MI in 8/2017; BELIA to LCX; 70% residual mid-distal LCX    Chronic pain     Diabetes     Herniated disc     Hyperlipidemia     Hypertension     [facility]bid [facility]     NSTEMI (non-ST elevated [facility] infarction) [facility]     Protein in urine 2016    Sleep apnea     [facility]     Wears glasses          SURGICAL HISTORY       Past Surgical History:   Procedure Laterality Date    CORONARY ANGIOPLASTY WITH STENT PLACEMENT  08/2017    BELIA to LCX    EYE SURGERY Bilateral     eye muscle--done as a child    KNEE SURGERY Bilateral     patella repair r/t work related accident         CURRENT MEDICATIONS       Previous Medications    CLOPIDOGREL (PLAVIX) 75 MG TABLET    Take 1 tablet by mouth daily    GLIMEPIRIDE (AMARYL) 2 MG TABLET    Take 2 mg by mouth Daily with supper    GLIPIZIDE (GLUCOTROL) 10 MG TABLET    Take 10 mg by mouth 2 times daily (before meals)    HYDROCHLOROTHIAZIDE (HYDRODIURIL) 12.5 MG TABLET    Take 1 tablet by mouth daily    ISOSORBIDE MONONITRATE (IMDUR) 60 MG EXTENDED RELEASE TABLET    Take 1 tablet by mouth daily    LIDOCAINE (LIDODERM) 5 %    Place 1 patch onto the skin daily 12 hours on, 12 hours off. LISINOPRIL (PRINIVIL;ZESTRIL) 40 MG TABLET    Take 40 mg by mouth daily. METFORMIN (GLUCOPHAGE) 500 MG TABLET    Take 1,000 mg by mouth 2 times daily (with meals)     METHOCARBAMOL (ROBAXIN) 500 MG TABLET    Take 1 tablet by mouth 4 times daily for 10 days    NITROGLYCERIN (NITROSTAT) 0.4 MG SL TABLET    Place 1 tablet under the tongue every 5 minutes as needed for Chest pain up to max of 3 total doses. If no relief after 1 dose, call 911. OXYCODONE-ACETAMINOPHEN (PERCOCET) 5-325 MG PER TABLET    Take 1 tablet by mouth every 6 hours as needed for Pain for up to 5 days. Intended supply: 5 days. Take lowest dose possible to manage pain    PREDNISONE (DELTASONE) 20 MG TABLET    Take 2 tablets by mouth daily for 3 days       ALLERGIES     Z-debbie [azithromycin]; Adalat [nifedipine]; Canagliflozin; Penicillins;  Exenatide; and Meloxicam    FAMILY HISTORY       Family History   Problem Relation Age of Onset    Cancer Mother     Cancer Other     Diabetes Other           SOCIAL HISTORY       Social History     Socioeconomic History    Marital status:      Spouse name: None    Number of children: None    Years of education: None    Highest education level: None   Occupational History    Occupation: Disability/Retirment   Social Needs    Financial resource strain: None    Food insecurity:     Worry: None     Inability: None    Transportation needs:     Medical: None     Non-medical: None   Tobacco Use    Smoking status: Current Every Day Smoker     Packs/day: 0.50     Types: Cigarettes    Smokeless tobacco: Never Used    Tobacco comment: Trying to cut down   Substance and Sexual Activity    Alcohol use: No    Drug use: No    Sexual activity: Yes     Partners: Female     Comment:    Lifestyle    Physical activity:     Days per week: None     Minutes per session: None    Stress: None   Relationships    Social connections:     Talks on phone: None     Gets together: None     Attends Yazidism service: None     Active member of club or organization: None     Attends meetings of clubs or organizations: None     Relationship status: None    Intimate partner violence:     Fear of current or ex partner: None     Emotionally abused: None     Physically abused: None     Forced sexual activity: None   Other Topics Concern    None   Social History Narrative    None       SCREENINGS               Review of Systems  Constitutional:  Denies fever, chills  Eyes: denies eye problems  HEENT: denies sore throat or ear pain  Respiratory: denies cough or shortness of breath  Cardiovascular: denies chest pain, palpitations  GI: denies abdominal pain, nausea, vomiting, or diarrhea  Musculoskeletal: Reports back pain radiating down left leg  Skin: denies rash  Neurologic: denies focal weakness or sensory changes    Except as noted above the remainder of the review of systems was reviewed and negative. PHYSICAL EXAM    (up to 7 for level 4, 8 or more for level 5)     ED Triage Vitals   BP Temp Temp src Pulse Resp SpO2 Height Weight   -- -- -- -- -- -- -- --       Constitutional: well-developed, well-nourished, no acute distress, nontoxic appearance  HEENT: normocephalic, atraumatic, oropharynx moist, nares patent  Neck: normal range of motion, no tenderness, trachea midline, no stridor  Eyes: PERRLA, EOMI, conjunctiva normal  Respiratory: normal breath sounds, non labored breathing pattern  Cardiovascular: normal heart rate, normal rhythm  GI: bowel sounds normal, soft, nontender, nondistended, no pulsatile masses  : deferred  Musculoskeletal: 5 out of 5 strength in bilateral lower Shoney's with hip flexion, knee flexion and extension, dorsiflexion and plantar flexion and ankles, first toe extension, normal sensation in the thigh, shin, bilateral feet, 2+ patellar reflexes bilaterally, normal sensation in the perineal region, 2+ DP and PT pulses bilaterally, intact distal pulses, no clubbing, cyanosis, or edema. Good range of motion  Back: No midline tenderness to palpation, no step-off, no deformity, mild tender to palpation in the left lumbar paravertebral region,  Integument: warm, dry, no erythema, no rash, < 2 second cap refill  Neurologic: alert and oriented ×3, no focal deficits appreciated    DIAGNOSTIC RESULTS         RADIOLOGY:   Interpretation per the Radiologist below, if available at the time of this note:    No orders to display         LABS:  Labs Reviewed - No data to display    All other labs were within normal range or not returned as of this dictation.     EMERGENCY DEPARTMENT COURSE and DIFFERENTIAL DIAGNOSIS/MDM:   Vitals:    Vitals:    04/15/19 2106 04/15/19 2157   BP: (!) 196/129 (!) 173/96   Pulse: 101 76   Resp: 14 18   Temp: 99.6 °F (37.6 °C) 99 °F (37.2 °C)   SpO2: 96% 96%   Weight: 267 lb 13.7 oz (121.5 kg)    Height: 6' (1.829 m)          MDM  62-year-old male presents to the ER for the third time in a week with complaint of left lumbar paravertebral pain. Reports intermittently taking his medications at home. Vital signs show elevated blood pressure as well as heart rate of 101. Physical exam as above. He has no other signs or symptoms of compressive cord syndrome. He has left lumbar paravertebral pain that radiates down his left leg. He is a positive straight leg raise on the left. His neurological exam and motor exam are intact. Discussed with the fail safe physician for [facility] and he states if the patient's motor and neuro exam are intact. Does not appear that he needs MRI imaging at this time. Discussed with the patient. He also reports that he had possibly want to go to a rehab facility to help with his back pain as well as he may need nursing home placement. We'll treat for pain here and will place a  consult. He reports symptoms have improved after morphine. He is still able to ambulate with his walker. Still no signs or symptoms of compressive cord syndrome other than lower back pain. His blood pressure improved without medications and was also given his home dose medications after the second reading of 173/96. He has no chest pain, shortness of breath or headache. This appears to be asymptomatic hypertension. As This patient appears stable stable, without any other signs or symptoms of compressive cord syndrome do not believe he needs an MRI at this time. We'll discharge home with social work consult. He has prednisone as well as oxycodone at home and was instructed to take these medications as well as stay compliant with his own prescriptions. Patient reports understanding and agrees with discharge plan. Return precautions given. CONSULTS:  IP CONSULT TO SOCIAL WORK      FINAL IMPRESSION      1.  Acute exacerbation of chronic low back pain          DISPOSITION/PLAN   DISPOSITION        PATIENT REFERRED TO:  Patti Smith

## 2019-04-16 NOTE — ED TRIAGE NOTES
Presents  To ED complaining of low back pain and left hip pain x 2 days. Says that pain radiates down left leg. Crying with pain. Walked in with walker and says that pain is making it harder to walk. Came by cab from home where he lives with his daughter. Says that he was treated here for same thing about a week ago. Was given medication that made the pain go away, but it is back again.

## 2019-04-30 ENCOUNTER — HOSPITAL ENCOUNTER (EMERGENCY)
Age: 59
Discharge: HOME | End: 2019-04-30
Attending: EMERGENCY MEDICINE
Payer: COMMERCIAL

## 2019-04-30 ENCOUNTER — APPOINTMENT (OUTPATIENT)
Dept: CT IMAGING | Age: 59
End: 2019-04-30
Payer: COMMERCIAL

## 2019-04-30 VITALS
TEMPERATURE: 98.2 F | HEART RATE: 80 BPM | RESPIRATION RATE: 16 BRPM | HEIGHT: 72 IN | DIASTOLIC BLOOD PRESSURE: 103 MMHG | BODY MASS INDEX: 35.38 KG/M2 | WEIGHT: 261.25 LBS | OXYGEN SATURATION: 95 % | SYSTOLIC BLOOD PRESSURE: 195 MMHG

## 2019-04-30 DIAGNOSIS — G89.29 ACUTE EXACERBATION OF CHRONIC LOW BACK PAIN: Primary | ICD-10-CM

## 2019-04-30 DIAGNOSIS — M54.50 ACUTE EXACERBATION OF CHRONIC LOW BACK PAIN: Primary | ICD-10-CM

## 2019-04-30 DIAGNOSIS — M54.32 SCIATICA OF LEFT SIDE: ICD-10-CM

## 2019-04-30 LAB
A/G RATIO: 1.3 (ref 1.1–2.2)
ALBUMIN SERPL-MCNC: 4.3 G/DL (ref 3.4–5)
ALP BLD-CCNC: 61 U/L (ref 40–129)
ALT SERPL-CCNC: 10 U/L (ref 10–40)
ANION GAP SERPL CALCULATED.3IONS-SCNC: 13 MMOL/L (ref 3–16)
AST SERPL-CCNC: 10 U/L (ref 15–37)
BASOPHILS ABSOLUTE: 0.1 K/UL (ref 0–0.2)
BASOPHILS RELATIVE PERCENT: 0.9 %
BILIRUB SERPL-MCNC: 0.6 MG/DL (ref 0–1)
BUN BLDV-MCNC: 14 MG/DL (ref 7–20)
CALCIUM SERPL-MCNC: 9.5 MG/DL (ref 8.3–10.6)
CHLORIDE BLD-SCNC: 106 MMOL/L (ref 99–110)
CO2: 22 MMOL/L (ref 21–32)
CREAT SERPL-MCNC: 0.7 MG/DL (ref 0.9–1.3)
EOSINOPHILS ABSOLUTE: 0.1 K/UL (ref 0–0.6)
EOSINOPHILS RELATIVE PERCENT: 0.9 %
GFR AFRICAN AMERICAN: >60
GFR NON-AFRICAN AMERICAN: >60
GLOBULIN: 3.3 G/DL
GLUCOSE BLD-MCNC: 267 MG/DL (ref 70–99)
HCT VFR BLD CALC: 44.9 % (ref 40.5–52.5)
HEMOGLOBIN: 14.9 G/DL (ref 13.5–17.5)
LIPASE: 12 U/L (ref 13–60)
LYMPHOCYTES ABSOLUTE: 2 K/UL (ref 1–5.1)
LYMPHOCYTES RELATIVE PERCENT: 25.4 %
MCH RBC QN AUTO: 29.4 PG (ref 26–34)
MCHC RBC AUTO-ENTMCNC: 33.3 G/DL (ref 31–36)
MCV RBC AUTO: 88.2 FL (ref 80–100)
MONOCYTES ABSOLUTE: 0.6 K/UL (ref 0–1.3)
MONOCYTES RELATIVE PERCENT: 7.2 %
NEUTROPHILS ABSOLUTE: 5.3 K/UL (ref 1.7–7.7)
NEUTROPHILS RELATIVE PERCENT: 65.6 %
PDW BLD-RTO: 15.4 % (ref 12.4–15.4)
PLATELET # BLD: 238 K/UL (ref 135–450)
PMV BLD AUTO: 9.6 FL (ref 5–10.5)
POTASSIUM REFLEX MAGNESIUM: 3.9 MMOL/L (ref 3.5–5.1)
RBC # BLD: 5.09 M/UL (ref 4.2–5.9)
SODIUM BLD-SCNC: 141 MMOL/L (ref 136–145)
TOTAL PROTEIN: 7.6 G/DL (ref 6.4–8.2)
WBC # BLD: 8 K/UL (ref 4–11)

## 2019-04-30 PROCEDURE — 6360000002 HC RX W HCPCS: Performed by: EMERGENCY MEDICINE

## 2019-04-30 PROCEDURE — 96374 THER/PROPH/DIAG INJ IV PUSH: CPT

## 2019-04-30 PROCEDURE — 99284 EMERGENCY DEPT VISIT MOD MDM: CPT

## 2019-04-30 PROCEDURE — 80053 COMPREHEN METABOLIC PANEL: CPT

## 2019-04-30 PROCEDURE — 96375 TX/PRO/DX INJ NEW DRUG ADDON: CPT

## 2019-04-30 PROCEDURE — 36415 COLL VENOUS BLD VENIPUNCTURE: CPT

## 2019-04-30 PROCEDURE — 83690 ASSAY OF LIPASE: CPT

## 2019-04-30 PROCEDURE — 74176 CT ABD & PELVIS W/O CONTRAST: CPT

## 2019-04-30 PROCEDURE — 85025 COMPLETE CBC W/AUTO DIFF WBC: CPT

## 2019-04-30 RX ORDER — METHOCARBAMOL 750 MG/1
750 TABLET, FILM COATED ORAL 4 TIMES DAILY
Qty: 20 TABLET | Refills: 0 | Status: SHIPPED | OUTPATIENT
Start: 2019-04-30 | End: 2019-05-05

## 2019-04-30 RX ORDER — ONDANSETRON 2 MG/ML
4 INJECTION INTRAMUSCULAR; INTRAVENOUS ONCE
Status: COMPLETED | OUTPATIENT
Start: 2019-04-30 | End: 2019-04-30

## 2019-04-30 RX ORDER — MORPHINE SULFATE 4 MG/ML
4 INJECTION, SOLUTION INTRAMUSCULAR; INTRAVENOUS ONCE
Status: COMPLETED | OUTPATIENT
Start: 2019-04-30 | End: 2019-04-30

## 2019-04-30 RX ORDER — NAPROXEN 500 MG/1
500 TABLET ORAL 2 TIMES DAILY
Qty: 10 TABLET | Refills: 0 | Status: ON HOLD | OUTPATIENT
Start: 2019-04-30 | End: 2019-05-15

## 2019-04-30 RX ADMIN — ONDANSETRON 4 MG: 2 INJECTION INTRAMUSCULAR; INTRAVENOUS at 12:40

## 2019-04-30 RX ADMIN — MORPHINE SULFATE 4 MG: 4 INJECTION INTRAVENOUS at 12:40

## 2019-04-30 ASSESSMENT — PAIN DESCRIPTION - DESCRIPTORS: DESCRIPTORS: STABBING

## 2019-04-30 ASSESSMENT — PAIN DESCRIPTION - LOCATION
LOCATION: BACK
LOCATION: BACK;LEG
LOCATION: BACK

## 2019-04-30 ASSESSMENT — PAIN - FUNCTIONAL ASSESSMENT: PAIN_FUNCTIONAL_ASSESSMENT: 0-10

## 2019-04-30 ASSESSMENT — PAIN DESCRIPTION - PAIN TYPE
TYPE: ACUTE PAIN;CHRONIC PAIN

## 2019-04-30 ASSESSMENT — PAIN DESCRIPTION - ORIENTATION: ORIENTATION: LEFT;LOWER

## 2019-04-30 ASSESSMENT — PAIN SCALES - GENERAL
PAINLEVEL_OUTOF10: 8
PAINLEVEL_OUTOF10: 2
PAINLEVEL_OUTOF10: 2
PAINLEVEL_OUTOF10: 10

## 2019-04-30 NOTE — CARE COORDINATION
VIOLET Consult:  Patient has been in the ED 6 times this month for back pain. VIOLET spoke with patient regarding getting a follow up appt for him with his PCP- Dr. Karis Inman. [phone]. VIOLET called Dr. Yenny Corado-  Patient has appointment with her on May 24th at 2pm.  Patient has Fasting Lab appointment this Thursday May 2nd at 9:15am.  Patient has appointment for Ultrasound of his Kidneys this Friday May 3rd at 3:15pm.  ALL appointments are at [address] [place] [address]. [address] [place] [address]. [address] [place] [id number] [phone]. They were unable to provide an appointment prior to the 24th.

## 2019-05-13 ENCOUNTER — APPOINTMENT (OUTPATIENT)
Dept: GENERAL RADIOLOGY | Age: 59
DRG: 247 | End: 2019-05-13
Payer: COMMERCIAL

## 2019-05-13 ENCOUNTER — HOSPITAL ENCOUNTER (INPATIENT)
Age: 59
LOS: 2 days | Discharge: HOME | DRG: 247 | End: 2019-05-15
Attending: EMERGENCY MEDICINE | Admitting: INTERNAL MEDICINE
Payer: COMMERCIAL

## 2019-05-13 DIAGNOSIS — R94.31 ACUTE ELECTROCARDIOGRAM CHANGES: ICD-10-CM

## 2019-05-13 DIAGNOSIS — I21.3 ST ELEVATION MYOCARDIAL INFARCTION (STEMI), UNSPECIFIED ARTERY (HCC): Primary | ICD-10-CM

## 2019-05-13 DIAGNOSIS — R07.9 CHEST PAIN, UNSPECIFIED TYPE: ICD-10-CM

## 2019-05-13 PROBLEM — I24.9 ACS (ACUTE CORONARY SYNDROME) (HCC): Status: ACTIVE | Noted: 2019-05-13

## 2019-05-13 PROBLEM — I25.10 CAD S/P PERCUTANEOUS CORONARY ANGIOPLASTY: Status: ACTIVE | Noted: 2019-05-13

## 2019-05-13 PROBLEM — Z98.61 CAD S/P PERCUTANEOUS CORONARY ANGIOPLASTY: Status: ACTIVE | Noted: 2019-05-13

## 2019-05-13 LAB
A/G RATIO: 1.3 (ref 1.1–2.2)
ALBUMIN SERPL-MCNC: 3.8 G/DL (ref 3.4–5)
ALP BLD-CCNC: 68 U/L (ref 40–129)
ALT SERPL-CCNC: 8 U/L (ref 10–40)
ANION GAP SERPL CALCULATED.3IONS-SCNC: 13 MMOL/L (ref 3–16)
AST SERPL-CCNC: 8 U/L (ref 15–37)
BASOPHILS ABSOLUTE: 0.1 K/UL (ref 0–0.2)
BASOPHILS RELATIVE PERCENT: 0.9 %
BILIRUB SERPL-MCNC: 0.3 MG/DL (ref 0–1)
BUN BLDV-MCNC: 18 MG/DL (ref 7–20)
CALCIUM SERPL-MCNC: 9.3 MG/DL (ref 8.3–10.6)
CHLORIDE BLD-SCNC: 103 MMOL/L (ref 99–110)
CHOLESTEROL, TOTAL: 137 MG/DL (ref 0–199)
CO2: 24 MMOL/L (ref 21–32)
CREAT SERPL-MCNC: 1.1 MG/DL (ref 0.9–1.3)
EKG ATRIAL RATE: 64 BPM
EKG ATRIAL RATE: 67 BPM
EKG ATRIAL RATE: 74 BPM
EKG ATRIAL RATE: 79 BPM
EKG DIAGNOSIS: NORMAL
EKG P AXIS: 55 DEGREES
EKG P AXIS: 58 DEGREES
EKG P AXIS: 58 DEGREES
EKG P AXIS: 63 DEGREES
EKG P-R INTERVAL: 162 MS
EKG P-R INTERVAL: 170 MS
EKG P-R INTERVAL: 170 MS
EKG P-R INTERVAL: 174 MS
EKG Q-T INTERVAL: 352 MS
EKG Q-T INTERVAL: 382 MS
EKG Q-T INTERVAL: 408 MS
EKG Q-T INTERVAL: 452 MS
EKG QRS DURATION: 82 MS
EKG QRS DURATION: 92 MS
EKG QRS DURATION: 96 MS
EKG QRS DURATION: 98 MS
EKG QTC CALCULATION (BAZETT): 403 MS
EKG QTC CALCULATION (BAZETT): 424 MS
EKG QTC CALCULATION (BAZETT): 431 MS
EKG QTC CALCULATION (BAZETT): 466 MS
EKG R AXIS: -28 DEGREES
EKG R AXIS: -5 DEGREES
EKG R AXIS: -7 DEGREES
EKG R AXIS: -9 DEGREES
EKG T AXIS: 104 DEGREES
EKG T AXIS: 106 DEGREES
EKG T AXIS: 262 DEGREES
EKG T AXIS: 92 DEGREES
EKG VENTRICULAR RATE: 64 BPM
EKG VENTRICULAR RATE: 67 BPM
EKG VENTRICULAR RATE: 74 BPM
EKG VENTRICULAR RATE: 79 BPM
EOSINOPHILS ABSOLUTE: 0.1 K/UL (ref 0–0.6)
EOSINOPHILS RELATIVE PERCENT: 0.8 %
GFR AFRICAN AMERICAN: >60
GFR NON-AFRICAN AMERICAN: >60
GLOBULIN: 3 G/DL
GLUCOSE BLD-MCNC: 152 MG/DL (ref 70–99)
GLUCOSE BLD-MCNC: 249 MG/DL (ref 70–99)
GLUCOSE BLD-MCNC: 316 MG/DL (ref 70–99)
GLUCOSE BLD-MCNC: 393 MG/DL (ref 70–99)
HCT VFR BLD CALC: 41.7 % (ref 40.5–52.5)
HDLC SERPL-MCNC: 45 MG/DL (ref 40–60)
HEMOGLOBIN: 14 G/DL (ref 13.5–17.5)
LDL CHOLESTEROL CALCULATED: 82 MG/DL
LEFT VENTRICULAR EJECTION FRACTION MODE: NORMAL
LV EF: 50 %
LYMPHOCYTES ABSOLUTE: 2.6 K/UL (ref 1–5.1)
LYMPHOCYTES RELATIVE PERCENT: 22.2 %
MCH RBC QN AUTO: 29.8 PG (ref 26–34)
MCHC RBC AUTO-ENTMCNC: 33.6 G/DL (ref 31–36)
MCV RBC AUTO: 88.5 FL (ref 80–100)
MONOCYTES ABSOLUTE: 1 K/UL (ref 0–1.3)
MONOCYTES RELATIVE PERCENT: 9.1 %
NEUTROPHILS ABSOLUTE: 7.7 K/UL (ref 1.7–7.7)
NEUTROPHILS RELATIVE PERCENT: 67 %
PDW BLD-RTO: 15 % (ref 12.4–15.4)
PERFORMED ON: ABNORMAL
PLATELET # BLD: 230 K/UL (ref 135–450)
PMV BLD AUTO: 9.6 FL (ref 5–10.5)
POC ACT LR: 196 SEC
POC ACT LR: 300 SEC
POTASSIUM REFLEX MAGNESIUM: 4.1 MMOL/L (ref 3.5–5.1)
RBC # BLD: 4.71 M/UL (ref 4.2–5.9)
SODIUM BLD-SCNC: 140 MMOL/L (ref 136–145)
TOTAL PROTEIN: 6.8 G/DL (ref 6.4–8.2)
TRIGL SERPL-MCNC: 48 MG/DL (ref 0–150)
TROPONIN: 0.01 NG/ML
VLDLC SERPL CALC-MCNC: 10 MG/DL
WBC # BLD: 11.5 K/UL (ref 4–11)

## 2019-05-13 PROCEDURE — 2100000000 HC CCU R&B

## 2019-05-13 PROCEDURE — C1894 INTRO/SHEATH, NON-LASER: HCPCS

## 2019-05-13 PROCEDURE — 93010 ELECTROCARDIOGRAM REPORT: CPT | Performed by: INTERNAL MEDICINE

## 2019-05-13 PROCEDURE — 6370000000 HC RX 637 (ALT 250 FOR IP): Performed by: EMERGENCY MEDICINE

## 2019-05-13 PROCEDURE — 2580000003 HC RX 258

## 2019-05-13 PROCEDURE — 2580000003 HC RX 258: Performed by: INTERNAL MEDICINE

## 2019-05-13 PROCEDURE — 99152 MOD SED SAME PHYS/QHP 5/>YRS: CPT

## 2019-05-13 PROCEDURE — 6360000002 HC RX W HCPCS: Performed by: INTERNAL MEDICINE

## 2019-05-13 PROCEDURE — 80053 COMPREHEN METABOLIC PANEL: CPT

## 2019-05-13 PROCEDURE — 92928 PRQ TCAT PLMT NTRAC ST 1 LES: CPT | Performed by: INTERNAL MEDICINE

## 2019-05-13 PROCEDURE — 93005 ELECTROCARDIOGRAM TRACING: CPT | Performed by: EMERGENCY MEDICINE

## 2019-05-13 PROCEDURE — 6360000002 HC RX W HCPCS

## 2019-05-13 PROCEDURE — 85347 COAGULATION TIME ACTIVATED: CPT

## 2019-05-13 PROCEDURE — 85025 COMPLETE CBC W/AUTO DIFF WBC: CPT

## 2019-05-13 PROCEDURE — 6360000004 HC RX CONTRAST MEDICATION: Performed by: EMERGENCY MEDICINE

## 2019-05-13 PROCEDURE — C1887 CATHETER, GUIDING: HCPCS

## 2019-05-13 PROCEDURE — 02C03ZZ EXTIRPATION OF MATTER FROM CORONARY ARTERY, ONE ARTERY, PERCUTANEOUS APPROACH: ICD-10-PCS | Performed by: INTERNAL MEDICINE

## 2019-05-13 PROCEDURE — 6370000000 HC RX 637 (ALT 250 FOR IP): Performed by: INTERNAL MEDICINE

## 2019-05-13 PROCEDURE — 71045 X-RAY EXAM CHEST 1 VIEW: CPT

## 2019-05-13 PROCEDURE — 6370000000 HC RX 637 (ALT 250 FOR IP)

## 2019-05-13 PROCEDURE — B2111ZZ FLUOROSCOPY OF MULTIPLE CORONARY ARTERIES USING LOW OSMOLAR CONTRAST: ICD-10-PCS | Performed by: INTERNAL MEDICINE

## 2019-05-13 PROCEDURE — C1874 STENT, COATED/COV W/DEL SYS: HCPCS

## 2019-05-13 PROCEDURE — 99291 CRITICAL CARE FIRST HOUR: CPT

## 2019-05-13 PROCEDURE — 99291 CRITICAL CARE FIRST HOUR: CPT | Performed by: INTERNAL MEDICINE

## 2019-05-13 PROCEDURE — C1725 CATH, TRANSLUMIN NON-LASER: HCPCS

## 2019-05-13 PROCEDURE — 93458 L HRT ARTERY/VENTRICLE ANGIO: CPT | Performed by: INTERNAL MEDICINE

## 2019-05-13 PROCEDURE — 96375 TX/PRO/DX INJ NEW DRUG ADDON: CPT

## 2019-05-13 PROCEDURE — 80061 LIPID PANEL: CPT

## 2019-05-13 PROCEDURE — B2151ZZ FLUOROSCOPY OF LEFT HEART USING LOW OSMOLAR CONTRAST: ICD-10-PCS | Performed by: INTERNAL MEDICINE

## 2019-05-13 PROCEDURE — 2500000003 HC RX 250 WO HCPCS

## 2019-05-13 PROCEDURE — 027034Z DILATION OF CORONARY ARTERY, ONE ARTERY WITH DRUG-ELUTING INTRALUMINAL DEVICE, PERCUTANEOUS APPROACH: ICD-10-PCS | Performed by: INTERNAL MEDICINE

## 2019-05-13 PROCEDURE — 83036 HEMOGLOBIN GLYCOSYLATED A1C: CPT

## 2019-05-13 PROCEDURE — 84484 ASSAY OF TROPONIN QUANT: CPT

## 2019-05-13 PROCEDURE — 36415 COLL VENOUS BLD VENIPUNCTURE: CPT

## 2019-05-13 PROCEDURE — C1769 GUIDE WIRE: HCPCS

## 2019-05-13 PROCEDURE — 93458 L HRT ARTERY/VENTRICLE ANGIO: CPT

## 2019-05-13 PROCEDURE — 92973 PRQ TRLUML C MCHN ASP THRMBC: CPT | Performed by: INTERNAL MEDICINE

## 2019-05-13 PROCEDURE — 96374 THER/PROPH/DIAG INJ IV PUSH: CPT

## 2019-05-13 PROCEDURE — 2720000010 HC SURG SUPPLY STERILE

## 2019-05-13 PROCEDURE — 6360000002 HC RX W HCPCS: Performed by: EMERGENCY MEDICINE

## 2019-05-13 PROCEDURE — 2709999900 HC NON-CHARGEABLE SUPPLY

## 2019-05-13 PROCEDURE — 4A023N7 MEASUREMENT OF CARDIAC SAMPLING AND PRESSURE, LEFT HEART, PERCUTANEOUS APPROACH: ICD-10-PCS | Performed by: INTERNAL MEDICINE

## 2019-05-13 PROCEDURE — 92941 PRQ TRLML REVSC TOT OCCL AMI: CPT

## 2019-05-13 PROCEDURE — 93005 ELECTROCARDIOGRAM TRACING: CPT | Performed by: INTERNAL MEDICINE

## 2019-05-13 PROCEDURE — 94761 N-INVAS EAR/PLS OXIMETRY MLT: CPT

## 2019-05-13 PROCEDURE — 99153 MOD SED SAME PHYS/QHP EA: CPT

## 2019-05-13 PROCEDURE — 2700000000 HC OXYGEN THERAPY PER DAY

## 2019-05-13 RX ORDER — DEXTROSE MONOHYDRATE 50 MG/ML
100 INJECTION, SOLUTION INTRAVENOUS PRN
Status: DISCONTINUED | OUTPATIENT
Start: 2019-05-13 | End: 2019-05-15 | Stop reason: HOSPADM

## 2019-05-13 RX ORDER — GLIPIZIDE 5 MG/1
10 TABLET ORAL
Status: DISCONTINUED | OUTPATIENT
Start: 2019-05-13 | End: 2019-05-15 | Stop reason: HOSPADM

## 2019-05-13 RX ORDER — ONDANSETRON 2 MG/ML
4 INJECTION INTRAMUSCULAR; INTRAVENOUS EVERY 6 HOURS PRN
Status: DISCONTINUED | OUTPATIENT
Start: 2019-05-13 | End: 2019-05-15 | Stop reason: HOSPADM

## 2019-05-13 RX ORDER — HEPARIN SODIUM 1000 [USP'U]/ML
5000 INJECTION, SOLUTION INTRAVENOUS; SUBCUTANEOUS ONCE
Status: COMPLETED | OUTPATIENT
Start: 2019-05-13 | End: 2019-05-13

## 2019-05-13 RX ORDER — HEPARIN SODIUM 5000 [USP'U]/ML
5000 INJECTION, SOLUTION INTRAVENOUS; SUBCUTANEOUS ONCE
Status: DISCONTINUED | OUTPATIENT
Start: 2019-05-13 | End: 2019-05-13

## 2019-05-13 RX ORDER — ATROPINE SULFATE 0.4 MG/ML
0.5 AMPUL (ML) INJECTION
Status: ACTIVE | OUTPATIENT
Start: 2019-05-13 | End: 2019-05-13

## 2019-05-13 RX ORDER — NICOTINE POLACRILEX 4 MG
15 LOZENGE BUCCAL PRN
Status: DISCONTINUED | OUTPATIENT
Start: 2019-05-13 | End: 2019-05-15 | Stop reason: HOSPADM

## 2019-05-13 RX ORDER — NITROGLYCERIN 0.4 MG/1
0.4 TABLET SUBLINGUAL ONCE
Status: COMPLETED | OUTPATIENT
Start: 2019-05-13 | End: 2019-05-13

## 2019-05-13 RX ORDER — SODIUM CHLORIDE 0.9 % (FLUSH) 0.9 %
10 SYRINGE (ML) INJECTION EVERY 12 HOURS SCHEDULED
Status: DISCONTINUED | OUTPATIENT
Start: 2019-05-13 | End: 2019-05-15 | Stop reason: HOSPADM

## 2019-05-13 RX ORDER — 0.9 % SODIUM CHLORIDE 0.9 %
250 INTRAVENOUS SOLUTION INTRAVENOUS PRN
Status: DISCONTINUED | OUTPATIENT
Start: 2019-05-13 | End: 2019-05-15 | Stop reason: HOSPADM

## 2019-05-13 RX ORDER — NITROGLYCERIN 0.4 MG/1
0.4 TABLET SUBLINGUAL EVERY 5 MIN PRN
Status: COMPLETED | OUTPATIENT
Start: 2019-05-13 | End: 2019-05-13

## 2019-05-13 RX ORDER — LIDOCAINE 4 G/G
1 PATCH TOPICAL DAILY
Status: DISCONTINUED | OUTPATIENT
Start: 2019-05-13 | End: 2019-05-15 | Stop reason: HOSPADM

## 2019-05-13 RX ORDER — ISOSORBIDE MONONITRATE 60 MG/1
60 TABLET, EXTENDED RELEASE ORAL DAILY
Status: DISCONTINUED | OUTPATIENT
Start: 2019-05-14 | End: 2019-05-15 | Stop reason: HOSPADM

## 2019-05-13 RX ORDER — ATORVASTATIN CALCIUM 80 MG/1
80 TABLET, FILM COATED ORAL NIGHTLY
Status: DISCONTINUED | OUTPATIENT
Start: 2019-05-13 | End: 2019-05-15 | Stop reason: HOSPADM

## 2019-05-13 RX ORDER — ACETAMINOPHEN 325 MG/1
650 TABLET ORAL EVERY 4 HOURS PRN
Status: DISCONTINUED | OUTPATIENT
Start: 2019-05-13 | End: 2019-05-15 | Stop reason: HOSPADM

## 2019-05-13 RX ORDER — MORPHINE SULFATE 10 MG/ML
8 INJECTION, SOLUTION INTRAMUSCULAR; INTRAVENOUS ONCE
Status: COMPLETED | OUTPATIENT
Start: 2019-05-13 | End: 2019-05-13

## 2019-05-13 RX ORDER — METOPROLOL SUCCINATE 50 MG/1
50 TABLET, EXTENDED RELEASE ORAL DAILY
Status: DISCONTINUED | OUTPATIENT
Start: 2019-05-13 | End: 2019-05-15 | Stop reason: HOSPADM

## 2019-05-13 RX ORDER — HYDRALAZINE HYDROCHLORIDE 20 MG/ML
10 INJECTION INTRAMUSCULAR; INTRAVENOUS
Status: DISCONTINUED | OUTPATIENT
Start: 2019-05-13 | End: 2019-05-15 | Stop reason: HOSPADM

## 2019-05-13 RX ORDER — MORPHINE SULFATE 2 MG/ML
2 INJECTION, SOLUTION INTRAMUSCULAR; INTRAVENOUS
Status: ACTIVE | OUTPATIENT
Start: 2019-05-13 | End: 2019-05-13

## 2019-05-13 RX ORDER — MORPHINE SULFATE 10 MG/ML
8 INJECTION, SOLUTION INTRAMUSCULAR; INTRAVENOUS ONCE
Status: DISCONTINUED | OUTPATIENT
Start: 2019-05-13 | End: 2019-05-15 | Stop reason: HOSPADM

## 2019-05-13 RX ORDER — DEXTROSE MONOHYDRATE 25 G/50ML
12.5 INJECTION, SOLUTION INTRAVENOUS PRN
Status: DISCONTINUED | OUTPATIENT
Start: 2019-05-13 | End: 2019-05-15 | Stop reason: HOSPADM

## 2019-05-13 RX ORDER — SODIUM CHLORIDE 0.9 % (FLUSH) 0.9 %
10 SYRINGE (ML) INJECTION PRN
Status: DISCONTINUED | OUTPATIENT
Start: 2019-05-13 | End: 2019-05-15 | Stop reason: HOSPADM

## 2019-05-13 RX ORDER — ASPIRIN 81 MG/1
81 TABLET ORAL DAILY
Status: DISCONTINUED | OUTPATIENT
Start: 2019-05-14 | End: 2019-05-15 | Stop reason: HOSPADM

## 2019-05-13 RX ORDER — LISINOPRIL 20 MG/1
40 TABLET ORAL DAILY
Status: DISCONTINUED | OUTPATIENT
Start: 2019-05-14 | End: 2019-05-15 | Stop reason: HOSPADM

## 2019-05-13 RX ORDER — BISACODYL 10 MG
10 SUPPOSITORY, RECTAL RECTAL DAILY PRN
Status: DISCONTINUED | OUTPATIENT
Start: 2019-05-13 | End: 2019-05-15 | Stop reason: HOSPADM

## 2019-05-13 RX ORDER — ASPIRIN 81 MG/1
324 TABLET, CHEWABLE ORAL ONCE
Status: COMPLETED | OUTPATIENT
Start: 2019-05-13 | End: 2019-05-13

## 2019-05-13 RX ORDER — NITROGLYCERIN 0.4 MG/1
0.4 TABLET SUBLINGUAL EVERY 5 MIN PRN
Status: DISCONTINUED | OUTPATIENT
Start: 2019-05-13 | End: 2019-05-15 | Stop reason: HOSPADM

## 2019-05-13 RX ADMIN — ACETAMINOPHEN 650 MG: 325 TABLET ORAL at 21:59

## 2019-05-13 RX ADMIN — ATORVASTATIN CALCIUM 80 MG: 80 TABLET, FILM COATED ORAL at 20:16

## 2019-05-13 RX ADMIN — Medication 10 ML: at 20:17

## 2019-05-13 RX ADMIN — GLIPIZIDE 10 MG: 5 TABLET ORAL at 16:37

## 2019-05-13 RX ADMIN — INSULIN LISPRO 4 UNITS: 100 INJECTION, SOLUTION INTRAVENOUS; SUBCUTANEOUS at 16:37

## 2019-05-13 RX ADMIN — MORPHINE SULFATE 8 MG: 10 INJECTION INTRAVENOUS at 08:03

## 2019-05-13 RX ADMIN — NITROGLYCERIN 0.4 MG: 0.4 TABLET SUBLINGUAL at 07:10

## 2019-05-13 RX ADMIN — IOPAMIDOL 162 ML: 755 INJECTION, SOLUTION INTRAVENOUS at 10:12

## 2019-05-13 RX ADMIN — METOPROLOL SUCCINATE 50 MG: 50 TABLET, FILM COATED, EXTENDED RELEASE ORAL at 13:34

## 2019-05-13 RX ADMIN — Medication 10 ML: at 13:25

## 2019-05-13 RX ADMIN — INSULIN LISPRO 1 UNITS: 100 INJECTION, SOLUTION INTRAVENOUS; SUBCUTANEOUS at 20:22

## 2019-05-13 RX ADMIN — HYDRALAZINE HYDROCHLORIDE 10 MG: 20 INJECTION INTRAMUSCULAR; INTRAVENOUS at 15:05

## 2019-05-13 RX ADMIN — INSULIN LISPRO 8 UNITS: 100 INJECTION, SOLUTION INTRAVENOUS; SUBCUTANEOUS at 13:34

## 2019-05-13 RX ADMIN — TICAGRELOR 90 MG: 90 TABLET ORAL at 20:16

## 2019-05-13 RX ADMIN — NITROGLYCERIN 0.4 MG: 0.4 TABLET SUBLINGUAL at 07:05

## 2019-05-13 RX ADMIN — ASPIRIN 81 MG 324 MG: 81 TABLET ORAL at 06:57

## 2019-05-13 RX ADMIN — NITROGLYCERIN 0.4 MG: 0.4 TABLET, ORALLY DISINTEGRATING SUBLINGUAL at 07:00

## 2019-05-13 RX ADMIN — HEPARIN SODIUM 5000 UNITS: 1000 INJECTION, SOLUTION INTRAVENOUS; SUBCUTANEOUS at 09:01

## 2019-05-13 ASSESSMENT — PAIN SCALES - GENERAL
PAINLEVEL_OUTOF10: 0
PAINLEVEL_OUTOF10: 4
PAINLEVEL_OUTOF10: 0
PAINLEVEL_OUTOF10: 6
PAINLEVEL_OUTOF10: 2
PAINLEVEL_OUTOF10: 9
PAINLEVEL_OUTOF10: 7
PAINLEVEL_OUTOF10: 0

## 2019-05-13 ASSESSMENT — HEART SCORE: ECG: 2

## 2019-05-13 ASSESSMENT — PAIN DESCRIPTION - FREQUENCY: FREQUENCY: CONTINUOUS

## 2019-05-13 ASSESSMENT — PAIN DESCRIPTION - LOCATION: LOCATION: CHEST

## 2019-05-13 ASSESSMENT — PAIN DESCRIPTION - DESCRIPTORS: DESCRIPTORS: STABBING

## 2019-05-13 ASSESSMENT — PAIN DESCRIPTION - PAIN TYPE: TYPE: ACUTE PAIN

## 2019-05-13 NOTE — ED PROVIDER NOTES
[id number] [facility]  eMERGENCY dEPARTMENT eNCOUnter      Pt Name: Miguel Anderson  MRN: [id number]  [place] 1960  Date of evaluation: 5/13/2019  Provider: Silke Edmonds, [address] [place] [address]       Chief Complaint   Patient presents with    Chest Pain     Woke up 2 hrs ago with CP. Describes it as stabbing, continuous. pain is located in the upper mid chest, denies radiation anywhere, denies numbness nor tingling on either arm. Denies n/v         HISTORY OF PRESENT ILLNESS   (Location/Symptom, Timing/Onset, Context/Setting, Quality, Duration, Modifying Factors, Severity)  Note limiting factors. Miguel Anderson is a 60 y.o. male who presents to the emergency department with a complaint of midsternal chest discomfort that awakened him from sleep prior to arrival. He took a taxi to the emergency department. He reports diaphoresis upon waking but denies any shortness of breath or nausea. No vomiting. He currently rates it a 9/10 intensity. The patient does have a history of an STEMI, coronary artery disease, and PCI proximately 1 year ago. He reports that his symptoms are similar to what he experienced at that time. He does take Plavix and is compliant with his medication. He has not taken any of his medications today. Medical history is also significant for diabetes, hyperlipidemia, and hypertension. He does have chronic low back pain and walks with a walker. HPI    Nursing Notes were reviewed. REVIEW OF SYSTEMS    (2-9 systems for level 4, 10 or more for level 5)       Constitutional: Negative for fever or chills. HENT: Negative for rhinorrhea and sore throat. Eyes: Negative for redness or drainage. Gastrointestinal: Negative for abdominal pain. Negative for vomiting or diarrhea. Genitourinary: Negative for flank pain. Negative for dysuria. Negative for hematuria. Neurological: Negative for headache.         All systems are reviewed and are negative except for those listed above in the history of present illness and ROS. PAST MEDICAL HISTORY     Past Medical History:   Diagnosis Date    Arthritis     CAD (coronary artery disease)     MI in 8/2017; BELIA to LCX; 70% residual mid-distal LCX    Chronic pain     Diabetes     Herniated disc     Hyperlipidemia     Hypertension     [facility]     NSTEMI (non-ST elevated myocardial infarction) ([facility]     Protein in urine 2016    Sleep apnea     Type 2 diabetes mellitus without complication ([facility]     Wears glasses          SURGICAL HISTORY       Past Surgical History:   Procedure Laterality Date    CORONARY ANGIOPLASTY WITH STENT PLACEMENT  08/2017    BELIA to LCX    EYE SURGERY Bilateral     eye muscle--done as a child    KNEE SURGERY Bilateral     patella repair r/t work related accident         CURRENT MEDICATIONS       Previous Medications    CLOPIDOGREL (PLAVIX) 75 MG TABLET    Take 1 tablet by mouth daily    GLIPIZIDE (GLUCOTROL) 10 MG TABLET    Take 10 mg by mouth 2 times daily (before meals)    HYDROCHLOROTHIAZIDE (HYDRODIURIL) 12.5 MG TABLET    Take 1 tablet by mouth daily    ISOSORBIDE MONONITRATE (IMDUR) 60 MG EXTENDED RELEASE TABLET    Take 1 tablet by mouth daily    LIDOCAINE (LIDODERM) 5 %    Place 1 patch onto the skin daily 12 hours on, 12 hours off. LISINOPRIL (PRINIVIL;ZESTRIL) 40 MG TABLET    Take 40 mg by mouth daily. METFORMIN (GLUCOPHAGE) 500 MG TABLET    Take 1,000 mg by mouth 2 times daily (with meals)     NAPROXEN (NAPROSYN) 500 MG TABLET    Take 1 tablet by mouth 2 times daily    NITROGLYCERIN (NITROSTAT) 0.4 MG SL TABLET    Place 1 tablet under the tongue every 5 minutes as needed for Chest pain up to max of 3 total doses. If no relief after 1 dose, call 911. ALLERGIES     Z-debbie [azithromycin]; Adalat [nifedipine]; Canagliflozin; Penicillins;  Exenatide; and Meloxicam    FAMILY HISTORY       Family History   Problem Relation Age of Onset    Cancer Mother    [facility] Cancer Other     Diabetes Other           SOCIAL HISTORY       Social History     Socioeconomic History    Marital status:      Spouse name: None    Number of children: None    Years of education: None    Highest education level: None   Occupational History    Occupation: Disability/Retirment   Social Needs    Financial resource strain: None    Food insecurity:     Worry: None     Inability: None    Transportation needs:     Medical: None     Non-medical: None   Tobacco Use    Smoking status: Current Every Day Smoker     Packs/day: 0.50     Types: Cigarettes    Smokeless tobacco: Never Used    Tobacco comment: Trying to cut down   Substance and Sexual Activity    Alcohol use: No    Drug use: No    Sexual activity: Yes     Partners: Female     Comment:    Lifestyle    Physical activity:     Days per week: None     Minutes per session: None    Stress: None   Relationships    Social connections:     Talks on phone: None     Gets together: None     Attends Latter day service: None     Active member of club or organization: None     Attends meetings of clubs or organizations: None     Relationship status: None    Intimate partner violence:     Fear of current or ex partner: None     Emotionally abused: None     Physically abused: None     Forced sexual activity: None   Other Topics Concern    None   Social History Narrative    None       SCREENINGS             PHYSICAL EXAM    (up to 7 for level 4, 8 or more for level 5)     ED Triage Vitals   BP Temp Temp src Pulse Resp SpO2 Height Weight   -- -- -- -- -- -- -- --       Physical Exam   Constitutional: Awake and alert and oriented to person place and time. No apparent distress. Head: No visible evidence of trauma. Normocephalic. Eyes: Pupils equal and reactive. No photophobia. Conjunctiva normal.    HENT: Oral mucosa moist.  Airway patent. Neck:  Soft and supple. Heart:  Regular rate and rhythm. No murmur.   Lungs:  Clear to auscultation. No wheezes, rales, or ronchi. No conversational dyspnea or accessory muscle use. Abdomen:  Soft, morbidly obese, nondistended, bowel sounds present. Nontender. No guarding rigidity or rebound. No masses. Musculoskeletal: Extremities non-tender with full range of motion. 80 Erik dorsalis pedis pulses were equal bilaterally. No calf tenderness or erythema. 1+ bilateral pedal and lower extremity edema was noted. He reports that this is consistent with his baseline. Neurological: Alert and oriented x 3. Speech clear. Cranial nerves II-XII intact. No facial droop. No acute focal motor or sensory deficits. Skin: Skin is warm and dry. No rash. Psychiatric: Normal mood and affect. Behavior is normal.         DIAGNOSTIC RESULTS     EKG: All EKG's are interpreted by the Emergency Department Physician who either signs or Co-signs this chart in the absence of a cardiologist.    Normal sinus rhythm. Rate 67. AK interval 162 ms. QRS duration 92 ms.  ms. R axis -9°. No ST elevation. Nonspecific T-wave abnormalities. EKG was similar in comparison to prior tracing from April 2, 2019. RADIOLOGY:   Non-plain film images such as CT, Ultrasound and MRI are read by the radiologist. Plain radiographic images are visualized and preliminarily interpreted by the emergency physician with the below findings:        Interpretation per the Radiologist below, if available at the time of this note:    XR CHEST PORTABLE    (Results Pending)         ED BEDSIDE ULTRASOUND:   Performed by ED Physician - none    LABS:  Labs Reviewed   CBC WITH AUTO DIFFERENTIAL   COMPREHENSIVE METABOLIC PANEL W/ REFLEX TO MG FOR LOW K   TROPONIN       All other labs were within normal range or not returned as of this dictation.     EMERGENCY DEPARTMENT COURSE and DIFFERENTIAL DIAGNOSIS/MDM:   Vitals:    Vitals:    05/13/19 0649   BP: (!) 177/91   Pulse: 68   Resp: 20   SpO2: 100%   Weight: 259 lb 11.2 oz (117.8 kg)   Height: 6' (1.829 m)       The patient presented with midsternal chest discomfort as noted above. At the time of initial examination he rated his pain and on over 10 intensity. He was given aspirin 324 mg by mouth and nitroglycerin 0.4 million sublingually. EKG was obtained which reveals normal sinus rhythm with nonspecific T-wave abnormalities. There is no ST elevation. MDM      REASSESSMENT          At the time of change of shift at 7 AM, chest x-ray and laboratory studies were pending. Care will be transferred to the oncoming physician for follow-up on test results and final disposition. CRITICAL CARE TIME   Total Critical Care time was 0 minutes, excluding separately reportable procedures. There was a high probability of clinically significant/life threatening deterioration in the patient's condition which required my urgent intervention. CONSULTS:  None    PROCEDURES:  Unless otherwise noted below, none     Procedures        FINAL IMPRESSION      1. Chest pain, unspecified type          DISPOSITION/PLAN   DISPOSITION        PATIENT REFERRED TO:  No follow-up provider specified. DISCHARGE MEDICATIONS:  New Prescriptions    No medications on file     Controlled Substances Monitoring:     RX Monitoring 4/12/2019   Attestation The Prescription Monitoring Report for this patient was reviewed today. Acute Pain Prescriptions Severe pain not adequately treated with lower dose. (Please note that portions of this note were completed with a voice recognition program.  Efforts were made to edit the dictations but occasionally words are mis-transcribed. )    Trevor Isaac DO (electronically signed)  Attending Emergency Physician          Dawit Dupree DO  05/13/19 8484

## 2019-05-13 NOTE — PROCEDURES
the midportion of the circumflex artery which was not contributing to  the patient's recent event. It was decided to proceed with medical  treatment for that disease. Next, left ventriculogram was obtained in  [place] projection. Pressure in the left ventricular cavity was obtained  before and after the left ventriculogram.  All the catheters were  removed. MEDICATION CHART:  The patient received about 100 mcg of fentanyl and 2  mg of Versed during the procedure. HEMODYNAMIC DATA:  Please the computerized printout. CORONARY ANGIOGRAPHY:  The findings are as follows:  1. Right coronary artery: It arises from the right sinus of Valsalva. It appears to be a nondominant artery. It does not have any significant  coronary artery disease. There are questionable collaterals to the left  circumflex artery. 2.  Left main trunk: It arises from the left sinus of Valsalva. It  divides into a left anterior descending artery and left circumflex  artery. Left main trunk is free of atherosclerosis. 3.  Left anterior descending artery is a moderate-sized artery. It  descends into the interventricular sulcus and wraps around the apex of  the heart. The left anterior descending artery has a mild disease in  the mid segment which is about 20% to 30%. There is a tiny diagonal  branch with a high-grade stenosis but the artery appears relatively  small. 4.  Left circumflex artery: It arises from the left main trunk. It is  100% occluded at the area of the stent in the proximal segment. 5.  Left ventriculogram reveals some inferior wall hypokinesis. Overall  ejection fraction is about 50%. OVERALL IMPRESSION:  1. Patent right coronary artery which appears nondominant. 2.  Patent left main trunk. 3.  Mild disease of the mid left anterior descending artery 20% to 30%. 4.  High-grade diagonal stenosis. 5.  A 100% occlusion of the proximal left circumflex artery.   6.  Mild left ventricular systolic dysfunction with estimated EF of  approximately 50%. 7.  Successful angioplasty followed by drug-eluting stent deployment in  the proximal segment with the help of thrombectomy catheter.  _____  stenosis reduced to 0% with a final diameter of 3.20. Residual 70%  stenosis in the mid to distal circumflex artery. In view of the above findings, we will treat the patient medically and  consider a chemical stress test for the mid to distal circumflex artery  and consider intervention only if he has abnormality in the circumflex  artery.     Mallampati score- 1  ASA score-3    Vandana Dumont MD    D: 05/13/2019 10:51:54       T: 05/13/2019 12:36:59     [email or web address]  Job#: [id number]     Doc#: [id number]    CC:  MD Maggie Watt MD

## 2019-05-13 NOTE — CONSULTS
Katerina Abdalla MD   nitroGLYCERIN (NITROSTAT) 0.4 MG SL tablet Place 1 tablet under the tongue every 5 minutes as needed for Chest pain up to max of 3 total doses. If no relief after 1 dose, call 911. 8/16/17  Yes Katerina Abdalla MD   lisinopril (PRINIVIL;ZESTRIL) 40 MG tablet Take 40 mg by mouth daily. Yes Historical Provider, MD   metformin (GLUCOPHAGE) 500 MG tablet Take 1,000 mg by mouth 2 times daily (with meals)    Yes Historical Provider, MD       Allergies:  Z-debbie [azithromycin]; Adalat [nifedipine]; Canagliflozin; Penicillins; Exenatide; and Meloxicam    Social History:       TOBACCO:   reports that he has been smoking cigarettes. He has been smoking about 0.50 packs per day. He has never used smokeless tobacco.  ETOH:   reports that he does not drink alcohol. Family History:      Reviewed in detail . Positive as follows:        Problem Relation Age of Onset    Cancer Mother     Cancer Other     Diabetes Other        REVIEW OF SYSTEMS:   Pertinent positives as noted in the HPI. All other systems reviewed and negative. PHYSICAL EXAM PERFORMED:  BP (!) 155/121   Pulse 64   Temp 98.5 °F (36.9 °C) (Oral)   Resp (!) 0   Ht 6' (1.829 m)   Wt 257 lb 0.9 oz (116.6 kg)   SpO2 100%   BMI 34.86 kg/m²   General appearance: No apparent distress, appears stated age and cooperative. Lethargic, falls asleeps  HEENT: Normal cephalic, atraumatic without obvious deformity. Pupils equal, round, and reactive to light. Extra ocular muscles intact. Conjunctivae/corneas clear. Neck: Supple, with full range of motion. No jugular venous distention. Trachea midline. Respiratory:  Normal respiratory effort. Clear to auscultation, bilaterally without Rales/Wheezes/Rhonchi. Cardiovascular: Regular rate and rhythm with normal S1/S2 without murmurs, rubs or gallops. Abdomen: Soft, non-tender, non-distended with normal bowel sounds. Musculoskeletal:  No clubbing, cyanosis or edema bilaterally.   Skin: Skin color, texture, turgor normal.  No rashes or lesions. Neurologic:  Neurovascularly intact without any focal sensory/motor deficits. Cranial nerves: II-XII intact, grossly non-focal.  Psychiatric: Alert and oriented, thought content appropriate, normal insight  Capillary Refill: Brisk,< 3 seconds   Peripheral Pulses: +2 palpable, equal bilaterally     Labs:     Recent Labs     05/13/19  0720   WBC 11.5*   HGB 14.0   HCT 41.7        Recent Labs     05/13/19  0720      K 4.1      CO2 24   BUN 18   CREATININE 1.1   CALCIUM 9.3     Recent Labs     05/13/19  0720   AST 8*   ALT 8*   BILITOT 0.3   ALKPHOS 68     No results for input(s): INR in the last 72 hours. Recent Labs     05/13/19  0720   TROPONINI 0.01       Urinalysis:    Lab Results   Component Value Date    NITRU Negative 04/12/2019    WBCUA 0 04/12/2019    BACTERIA 1+ 06/29/2013    RBCUA 1 04/12/2019    BLOODU Negative 04/12/2019    SPECGRAV 1.019 04/12/2019    GLUCOSEU 250 04/12/2019    GLUCOSEU NEGATIVE 03/22/2010       Radiology: I have reviewed the radiology reports with the following interpretation:     XR CHEST PORTABLE   Final Result   No acute process. EKG:  I have reviewed the EKG with the following interpretation:    Stemi       ASSESSMENT:    Active Hospital Problems    Diagnosis Date Noted    CAD S/P percutaneous coronary angioplasty [I25.10, Z98.61] 05/13/2019    ACS (acute coronary syndrome) ([facility] [address] [I24.9] 05/13/2019    [facility] [I21.3]        PLAN:     Stemi  -per cardiology    DM2  - ct home meds  - SSI  - a1c    HTN  -ct home meds        Diet: DIET CARDIAC; Carb Control: 4 carb choices (60 gms)/meal  Code Status: Full Code        Thank you for the consultation, will follow up .     Dina Martins MD

## 2019-05-13 NOTE — H&P
[address] [facility]                              HISTORY AND PHYSICAL    PATIENT NAME: Kenny Desai                  :        1960  MED REC NO:   [id number]                          ROOM:       1311  ACCOUNT NO:   [de-identified]                           ADMIT DATE: 2019  PROVIDER:     Alisha Ramirez MD    HISTORY OF PRESENT ILLNESS:  This is a 50-year-old -American male  who presented to [facility] Emergency Room with symptoms of chest pain  which started around 5 a.m. this morning. The patient had the pain  occurring in the upper and mid chest without any radiation, without any  shortness of breath. His initial EKG showed minor subtle ST and T-wave  elevation in the inferior and lateral leads. He was started on  nitrates, heparin. His symptoms slightly improved but his EKG started  to worsen and show ST elevation in V4 to V6. Decision was made to  transfer the patient to [facility] [place] for possible STEMI. He  supposedly had a previous stent placement in the circumflex artery and  has been supposedly noncompliant with his medications. REVIEW OF SYSTEMS:  Please see HPI. All other systems are reviewed and  they are negative. PAST MEDICAL HISTORY:  1. History of coronary artery disease status post stent placed in the  circumflex artery in 2017. 2.  Hypertension. 3.  Hyperlipidemia. 4.  Morbid obesity. 5.  Supposed history of poor compliance. 6.  Diabetes mellitus. 7.  Chronic pain. PAST SURGICAL HISTORY:  The patient had stents placed as mentioned in  his circumflex artery, knee surgery, eye surgery. SOCIAL HISTORY:  He smokes about half pack per day. Alcohol usage is  none.     CURRENT MEDICATIONS:  His medicines at home included Naprosyn, Lidoderm  patch, glipizide 10 mg twice a day, Plavix 75 mg daily,  hydrochlorothiazide 12.5 mg daily, isosorbide 50 mg daily, lisinopril 40  mg daily, and metformin 1000 mg twice a day. ALLERGIES:  AZITHROMYCIN, MELOXICAM, EXENATIDE. PHYSICAL EXAMINATION:  GENERAL:  A pleasant male in mild to moderate distress. VITAL SIGNS:  Pulse is about 70 to 80, blood pressure 170/90,  respirations are 18, temperature was normal.  CONSTITUTIONAL:  He is alert, oriented. NECK:  I could not appreciate any jugular venous distention. Neck  otherwise is supple. No carotid bruit. CARDIAC:  Normal S1, S2. No gallop, murmur, or rub. LUNGS:  Largely clear to auscultation and palpation. ABDOMEN:  Soft. EXTREMITIES:  No edema. NEUROLOGICAL:  Alert, oriented. Cranial nerves II through XI intact. No focal deficits. SKIN:  No rashes. LABORATORY DATA:  Sodium 140, potassium 4.1, chloride 103, bicarb 24,  BUN is 18, creatinine is 1.1. Initial troponin is 0.01. Albumin is  3.8. Sugar was 393. White count 11.5, hemoglobin 14, hematocrit 41.7,  platelets are normal.    His EKG has shown sinus rhythm and ST elevation in V4 to V6 with some  sort of left elevation in 2, 3, and aVF. The patient had also some mild  ST depression in lead V1 to V2. It is suggestive of acute lateral wall  MI. IMPRESSION:  1. Circumflex stent occlusion is suspected. 2.  Hypertension. 3.  Diabetes. 4.  History of poor drug compliance. RECOMMENDATION:  We will proceed with the emergency angiography with  possible intervention. We will aggressively work on risk factor  modification and the patient is willing to proceed. Benefits and risks  of the procedure have been explained.         Milton Gonzalez MD    D: 05/13/2019 10:35:34       T: 05/13/2019 16:54:01     [email or web address]  Job#: [id number]     Doc#: [id number]    CC:  MD Cm Daugherty MD

## 2019-05-13 NOTE — ED PROVIDER NOTES
Triage Chief Complaint:   Chest Pain (Woke up 2 hrs ago with CP. Describes it as stabbing, continuous. pain is located in the upper mid chest, denies radiation anywhere, denies numbness nor tingling on either arm. Denies n/v)    [place]:  Mazin Werner is a 60 y.o. male that presents for evaluation of acute retrosternal chest pain. He woke up with chest pain this morning. He has had some diaphoresis without nausea or vomiting. The pain does not radiate. It has been constant since onset and started a few hours ago. He states he has had similar pain in the past when he had ACS. He denies any significant cough, fever, abdominal pain, rash, and has not had anything for his pain yet this morning. He states he woke up around 5:00 this morning and that was when he noticed the pain. ROS:  At least 10 systems reviewed and otherwise acutely negative except as in the [address]     Past Medical History:   Diagnosis Date    Arthritis     CAD (coronary artery disease)     MI in 8/2017; BELIA to LCX; 70% residual mid-distal LCX    Chronic pain     Diabetes     Herniated disc     Hyperlipidemia     Hypertension     Morbid obesity ([address]     NSTEMI (non-ST elevated myocardial infarction) ([address]     Protein in urine 2016    Sleep apnea     Type 2 diabetes mellitus without complication ([address]     Wears glasses      Past Surgical History:   Procedure Laterality Date    CORONARY ANGIOPLASTY WITH STENT PLACEMENT  08/2017    BELIA to LCX    EYE SURGERY Bilateral     eye muscle--done as a child    KNEE SURGERY Bilateral     patella repair r/t work related accident     Family History   Problem Relation Age of Onset    Cancer Mother     Cancer Other     Diabetes Other      Social History     Socioeconomic History    Marital status:      Spouse name: Not on file    Number of children: Not on file    Years of education: Not on file    Highest education level: Not on file   Occupational History    Occupation: Disability/Retirment   Social Needs    Financial resource strain: Not on file    Food insecurity:     Worry: Not on file     Inability: Not on file    Transportation needs:     Medical: Not on file     Non-medical: Not on file   Tobacco Use    Smoking status: Current Every Day Smoker     Packs/day: 0.50     Types: Cigarettes    Smokeless tobacco: Never Used    Tobacco comment: Trying to cut down   Substance and Sexual Activity    Alcohol use: No    Drug use: No    Sexual activity: Yes     Partners: Female     Comment:    Lifestyle    Physical activity:     Days per week: Not on file     Minutes per session: Not on file    Stress: Not on file   Relationships    Social connections:     Talks on phone: Not on file     Gets together: Not on file     Attends [employer] service: Not on file     Active member of club or organization: Not on file     Attends meetings of clubs or organizations: Not on file     Relationship status: Not on file    Intimate partner violence:     Fear of current or ex partner: Not on file     Emotionally abused: Not on file     Physically abused: Not on file     Forced sexual activity: Not on file   Other Topics Concern    Not on file   Social History Narrative    Not on file     Current Facility-Administered Medications   Medication Dose Route Frequency Provider Last Rate Last Dose    heparin (porcine) injection 5,000 Units  5,000 Units Intravenous Once Jesus Manuel Ballesteros MD        morphine injection 8 mg  8 mg Intravenous Once Jesus Manuel Ballesteros MD         Current Outpatient Medications   Medication Sig Dispense Refill    glipiZIDE (GLUCOTROL) 10 MG tablet Take 10 mg by mouth 2 times daily (before meals)      metformin (GLUCOPHAGE) 500 MG tablet Take 1,000 mg by mouth 2 times daily (with meals)       naproxen (NAPROSYN) 500 MG tablet Take 1 tablet by mouth 2 times daily 10 tablet 0    lidocaine (LIDODERM) 5 % Place 1 patch onto the skin daily 12 hours on, 12 hours off. interpreted all of the currently available lab results from this visit (if applicable):  Results for orders placed or performed during the hospital encounter of 05/13/19   CBC Auto Differential   Result Value Ref Range    WBC 11.5 (H) 4.0 - 11.0 K/uL    RBC 4.71 4.20 - 5.90 M/uL    Hemoglobin 14.0 13.5 - 17.5 g/dL    Hematocrit 41.7 40.5 - 52.5 %    MCV 88.5 80.0 - 100.0 fL    MCH 29.8 26.0 - 34.0 pg    MCHC 33.6 31.0 - 36.0 g/dL    RDW 15.0 12.4 - 15.4 %    Platelets 197 208 - 015 K/uL    MPV 9.6 5.0 - 10.5 fL    Neutrophils % 67.0 %    Lymphocytes % 22.2 %    Monocytes % 9.1 %    Eosinophils % 0.8 %    Basophils % 0.9 %    Neutrophils # 7.7 1.7 - 7.7 K/uL    Lymphocytes # 2.6 1.0 - 5.1 K/uL    Monocytes # 1.0 0.0 - 1.3 K/uL    Eosinophils # 0.1 0.0 - 0.6 K/uL    Basophils # 0.1 0.0 - 0.2 K/uL   Comprehensive Metabolic Panel w/ Reflex to MG   Result Value Ref Range    Sodium 140 136 - 145 mmol/L    Potassium reflex Magnesium 4.1 3.5 - 5.1 mmol/L    Chloride 103 99 - 110 mmol/L    CO2 24 21 - 32 mmol/L    Anion Gap 13 3 - 16    Glucose 393 (H) 70 - 99 mg/dL    BUN 18 7 - 20 mg/dL    CREATININE 1.1 0.9 - 1.3 mg/dL    GFR Non-African American >60 >60    GFR African American >60 >60    Calcium 9.3 8.3 - 10.6 mg/dL    Total Protein 6.8 6.4 - 8.2 g/dL    Alb 3.8 3.4 - 5.0 g/dL    Albumin/Globulin Ratio 1.3 1.1 - 2.2    Total Bilirubin 0.3 0.0 - 1.0 mg/dL    Alkaline Phosphatase 68 40 - 129 U/L    ALT 8 (L) 10 - 40 U/L    AST 8 (L) 15 - 37 U/L    Globulin 3.0 g/dL   Troponin   Result Value Ref Range    Troponin 0.01 <0.01 ng/mL        Radiographs (if obtained):  [] The following radiograph was interpreted by myself in the absence of a radiologist:  [x] Radiologist's Report Reviewed:  Ct Abdomen Pelvis Wo Contrast Additional Contrast? None    Result Date: 4/30/2019  EXAMINATION: CT OF THE ABDOMEN AND PELVIS WITHOUT CONTRAST 4/30/2019 12:42 pm TECHNIQUE: CT of the abdomen and pelvis was performed without the administration of intravenous contrast. Multiplanar reformatted images are provided for review. Dose modulation, iterative reconstruction, and/or weight based adjustment of the mA/kV was utilized to reduce the radiation dose to as low as reasonably achievable. COMPARISON: April 12, 2019 HISTORY: ORDERING SYSTEM PROVIDED HISTORY: left flank pain TECHNOLOGIST PROVIDED HISTORY: Additional Contrast?->None Ordering Physician Provided Reason for Exam: pt complains of left sided low back pain and left leg pain. no GI symptoms  pt states pain began last night. has been seen in er for same sx multiple times in the last 6 months Acuity: Chronic Type of Exam: Ongoing FINDINGS: Lower Chest: Unremarkable Organs: Liver, gallbladder, pancreas, and spleen are unremarkable. GI/Bowel: No bowel obstruction. Appendix is unremarkable. Pelvis: No bladder stone. No pelvic fluid collection. Mild soft tissue edema. No adenopathy. Mild vascular calcifications. Peritoneum/Retroperitoneum: No abdominal aortic aneurysm. Adrenal glands unremarkable. Bilateral parapelvic and renal cysts. No renal stone. Largest cyst in the inferior right kidney which measures 3.1 x 3.4 cm. Bones/Soft Tissues: Mild diffuse soft tissue edema. No acute bony abnormality. No acute findings. Xr Chest Portable    Result Date: 5/13/2019  EXAMINATION: ONE XRAY VIEW OF THE CHEST 5/13/2019 6:49 am COMPARISON: Chest radiograph April 2, 2019 and priors HISTORY: ORDERING SYSTEM PROVIDED HISTORY: chest pain TECHNOLOGIST PROVIDED HISTORY: Reason for exam:->chest pain Ordering Physician Provided Reason for Exam: Chest Pain (Woke up 2 hrs ago with CP. Describes it as stabbing, continuous. pain is located in the upper mid chest, denies radiation anywhere, denies numbness nor tingling on either arm. Denies n/v) Acuity: Acute Type of Exam: Initial FINDINGS: The lungs are without acute focal process. Calcified granuloma is again seen in the right base.   There is no effusion or with Dr. Vinh Rodriguez at [facility] [place].  He agrees that the repeat EKG is concerning for ACS. We will do a 30 EKG and if it is progressing the patient will be taken emergently to the Cath Lab    8:52 AM-repeat EKG is concerning for STEMI with increased ST elevation in leads V5 and V6. The patient will be emergently taken to [facility] [place] for cardiac catheterization. 5000 unit heparin bolus was ordered. Patient was given scripts for the following medications. I counseled patient how to take these medications. New Prescriptions    No medications on file         CRITICAL CARE TIME   Total Critical Care time was at least 35 minutes, excluding separately reportable procedures. There was a high probability of clinically significant/life threatening deterioration in the patient's condition which required my urgent intervention. Clinical Impression:  1. ST elevation myocardial infarction (STEMI), unspecified artery (HCC)    2. Chest pain, unspecified type    3.  Acute electrocardiogram changes       (Please note that portions of this note may have been completed with a voice recognition program. Efforts were made to edit the dictations but occasionally words are mis-transcribed.)    MD Pierce Salinas MD  05/13/19 4883 [facility] MD  05/13/19 8223

## 2019-05-14 LAB
ANION GAP SERPL CALCULATED.3IONS-SCNC: 14 MMOL/L (ref 3–16)
BUN BLDV-MCNC: 13 MG/DL (ref 7–20)
CALCIUM SERPL-MCNC: 9 MG/DL (ref 8.3–10.6)
CHLORIDE BLD-SCNC: 104 MMOL/L (ref 99–110)
CO2: 19 MMOL/L (ref 21–32)
CREAT SERPL-MCNC: 0.8 MG/DL (ref 0.9–1.3)
ESTIMATED AVERAGE GLUCOSE: 228.8 MG/DL
GFR AFRICAN AMERICAN: >60
GFR NON-AFRICAN AMERICAN: >60
GLUCOSE BLD-MCNC: 162 MG/DL (ref 70–99)
GLUCOSE BLD-MCNC: 195 MG/DL (ref 70–99)
GLUCOSE BLD-MCNC: 233 MG/DL (ref 70–99)
GLUCOSE BLD-MCNC: 237 MG/DL (ref 70–99)
GLUCOSE BLD-MCNC: 315 MG/DL (ref 70–99)
HBA1C MFR BLD: 9.6 %
LV EF: 53 %
LVEF MODALITY: NORMAL
PERFORMED ON: ABNORMAL
PLATELET # BLD: 196 K/UL (ref 135–450)
POTASSIUM SERPL-SCNC: 3.7 MMOL/L (ref 3.5–5.1)
SODIUM BLD-SCNC: 137 MMOL/L (ref 136–145)

## 2019-05-14 PROCEDURE — 85049 AUTOMATED PLATELET COUNT: CPT

## 2019-05-14 PROCEDURE — 2140000000 HC CCU INTERMEDIATE R&B

## 2019-05-14 PROCEDURE — 93306 TTE W/DOPPLER COMPLETE: CPT

## 2019-05-14 PROCEDURE — 80048 BASIC METABOLIC PNL TOTAL CA: CPT

## 2019-05-14 PROCEDURE — 2580000003 HC RX 258: Performed by: INTERNAL MEDICINE

## 2019-05-14 PROCEDURE — 94761 N-INVAS EAR/PLS OXIMETRY MLT: CPT

## 2019-05-14 PROCEDURE — 6370000000 HC RX 637 (ALT 250 FOR IP): Performed by: INTERNAL MEDICINE

## 2019-05-14 PROCEDURE — 36415 COLL VENOUS BLD VENIPUNCTURE: CPT

## 2019-05-14 PROCEDURE — 99232 SBSQ HOSP IP/OBS MODERATE 35: CPT | Performed by: NURSE PRACTITIONER

## 2019-05-14 RX ADMIN — ATORVASTATIN CALCIUM 80 MG: 80 TABLET, FILM COATED ORAL at 20:28

## 2019-05-14 RX ADMIN — INSULIN LISPRO 2 UNITS: 100 INJECTION, SOLUTION INTRAVENOUS; SUBCUTANEOUS at 09:11

## 2019-05-14 RX ADMIN — GLIPIZIDE 10 MG: 5 TABLET ORAL at 09:07

## 2019-05-14 RX ADMIN — METOPROLOL SUCCINATE 50 MG: 50 TABLET, FILM COATED, EXTENDED RELEASE ORAL at 09:07

## 2019-05-14 RX ADMIN — INSULIN LISPRO 8 UNITS: 100 INJECTION, SOLUTION INTRAVENOUS; SUBCUTANEOUS at 12:54

## 2019-05-14 RX ADMIN — INSULIN LISPRO 2 UNITS: 100 INJECTION, SOLUTION INTRAVENOUS; SUBCUTANEOUS at 20:34

## 2019-05-14 RX ADMIN — ISOSORBIDE MONONITRATE 60 MG: 60 TABLET ORAL at 09:07

## 2019-05-14 RX ADMIN — TICAGRELOR 90 MG: 90 TABLET ORAL at 09:07

## 2019-05-14 RX ADMIN — ASPIRIN 81 MG: 81 TABLET, COATED ORAL at 09:07

## 2019-05-14 RX ADMIN — LISINOPRIL 40 MG: 20 TABLET ORAL at 09:07

## 2019-05-14 RX ADMIN — INSULIN LISPRO 4 UNITS: 100 INJECTION, SOLUTION INTRAVENOUS; SUBCUTANEOUS at 16:17

## 2019-05-14 RX ADMIN — Medication 10 ML: at 20:29

## 2019-05-14 RX ADMIN — GLIPIZIDE 10 MG: 5 TABLET ORAL at 16:18

## 2019-05-14 RX ADMIN — TICAGRELOR 90 MG: 90 TABLET ORAL at 20:28

## 2019-05-14 SDOH — ECONOMIC STABILITY: FOOD INSECURITY: ADDITIONAL INFORMATION: NO

## 2019-05-14 ASSESSMENT — PROBLEM AREAS IN DIABETES QUESTIONNAIRE (PAID)
PAID-5 TOTAL SCORE: 18
FEELING SCARED WHEN YOU THINK ABOUT LIVING WITH DIABETES: 4
FEELING THAT DIABETES IS TAKING UP TOO MUCH OF YOUR MENTAL AND PHYSICAL ENERGY EVERY DAY: 4
WORRYING ABOUT THE FUTURE AND THE POSSIBILITY OF SERIOUS COMPLICATIONS: 4
COPING WITH COMPLICATIONS OF DIABETES: 2
FEELING DEPRESSED WHEN YOU THINK ABOUT LIVING WITH DIABETES: 4

## 2019-05-14 ASSESSMENT — PAIN SCALES - GENERAL
PAINLEVEL_OUTOF10: 0

## 2019-05-14 NOTE — PROGRESS NOTES
Progress Note  Admit Date: 2019      PCP: Annmarie Jain MD     CC: F/U for chest  pain    SUBJECTIVE / Interval History:  Feels ok   Admits to noncompliance        Allergies  Z-debbie [azithromycin]; Adalat [nifedipine]; Canagliflozin; Penicillins; Exenatide; and Meloxicam    Medications    Scheduled Meds:   morphine  8 mg Intravenous Once    lisinopril  40 mg Oral Daily    isosorbide mononitrate  60 mg Oral Daily    glipiZIDE  10 mg Oral BID AC    lidocaine  1 patch Topical Daily    sodium chloride flush  10 mL Intravenous 2 times per day    metoprolol succinate  50 mg Oral Daily    atorvastatin  80 mg Oral Nightly    aspirin  81 mg Oral Daily    ticagrelor  90 mg Oral BID    insulin lispro  0-12 Units Subcutaneous TID WC    insulin lispro  0-6 Units Subcutaneous Nightly     Continuous Infusions:   dextrose         PRN Meds:  nitroGLYCERIN, sodium chloride flush, acetaminophen, sodium chloride, magnesium hydroxide, bisacodyl, ondansetron, hydrALAZINE, glucose, dextrose, glucagon (rDNA), dextrose    Vitals    TEMPERATURE:  Current - Temp: 97.5 °F (36.4 °C); Max - Temp  Av.6 °F (36.4 °C)  Min: 97.5 °F (36.4 °C)  Max: 97.8 °F (36.6 °C)  RESPIRATIONS RANGE: Resp  Av.9  Min: 0  Max: 26  PULSE RANGE: Pulse  Av.7  Min: 58  Max: 79  BLOOD PRESSURE RANGE:  Systolic (04XIO), DIP:626 , Min:117 , IIJ:613   ; Diastolic (55QVM), SFJ:74, Min:70, Max:121    PULSE OXIMETRY RANGE: SpO2  Av.5 %  Min: 96 %  Max: 100 %  24HR INTAKE/OUTPUT:      Intake/Output Summary (Last 24 hours) at 2019 0718  Last data filed at 2019 1645  Gross per 24 hour   Intake 626 ml   Output 400 ml   Net 226 ml       Exam:    Gen: No distress. Eyes: PERRL. No sclera icterus. No conjunctival injection. ENT: No discharge. Pharynx clear. External appearance of ears and nose normal.  Neck: Trachea midline. No obvious mass. Resp: No accessory muscle use. No crackles. No wheezes. No rhonchi.  No dullness on percussion. CV: Regular rate. Regular rhythm. No murmur or rub. No edema. GI: Non-tender. Non-distended. No hernia. Skin: Warm, dry, normal texture and turgor. No nodule on exposed extremities. Lymph: No cervical LAD. No supraclavicular LAD. M/S: No cyanosis. No clubbing. No joint deformity. Neuro: Moves all four extremities. CN 2-12 tested, no defect noted. Psych: Oriented x 3. No anxiety. Awake. Alert. Intact judgement and insight. Data    LABS  CBC:   Recent Labs     05/13/19  0720 05/14/19  0509   WBC 11.5*  --    HGB 14.0  --    HCT 41.7  --    MCV 88.5  --     196     BMP:   Recent Labs     05/13/19  0720 05/14/19  0509    137   K 4.1 3.7    104   CO2 24 19*   BUN 18 13   CREATININE 1.1 0.8*     POC GLUCOSE:    Recent Labs     05/13/19  1300 05/13/19  1634 05/13/19  2020   POCGLU 316* 249* 152*     LIVER PROFILE:   Recent Labs     05/13/19  0720   AST 8*   ALT 8*   LABALBU 3.8   BILITOT 0.3   ALKPHOS 68     PT/INR: No results for input(s): PROTIME, INR in the last 72 hours. APTT: No results for input(s): APTT in the last 72 hours. UA:No results for input(s): NITRITE, COLORU, PHUR, LABCAST, WBCUA, RBCUA, MUCUS, TRICHOMONAS, YEAST, BACTERIA, CLARITYU, SPECGRAV, LEUKOCYTESUR, UROBILINOGEN, BILIRUBINUR, BLOODU, GLUCOSEU, KETUA, AMORPHOUS in the last 72 hours. Microbiology:  Wound Culture: No results for input(s): WNDABS, ORG in the last 72 hours. Invalid input(s):  LABGRAM  Nasal Culture: No results for input(s): ORG, MRSAPCR in the last 72 hours. Blood Culture: No results for input(s): BC, BLOODCULT2 in the last 72 hours. Fungal Culture:   No results for input(s): FUNGSM in the last 72 hours. No results for input(s): FUNCXBLD in the last 72 hours. CSF Culture:  No results for input(s): COLORCSF, APPEARCSF, CFTUBE, CLOTCSF, WBCCSF, RBCCSF, NEUTCSF, NUMCELLSCSF, LYMPHSCSF, MONOCSF, GLUCCSF, VOLCSF in the last 72 hours.   Respiratory Culture:  No results for input(s): [employer] in the last 72 hours. AFB:No results for input(s): AFBSMEAR in the last 72 hours. Urine Culture  No results for input(s): LABURIN in the last 72 hours. RADIOLOGY:    XR CHEST PORTABLE   Final Result   No acute process. CONSULTS:    IP CONSULT TO HOSPITALIST  IP CONSULT TO CARDIOLOGY  IP CONSULT TO CARDIAC REHAB  IP CONSULT TO DIABETES EDUCATOR    ASSESSMENT AND PLAN:      Active Problems:    CAD S/P percutaneous coronary angioplasty    ACS [facility]acute coronary syndrome) ([facility])    ST elevation myocardial infarction (STEMI) ([facility]  Resolved Problems:    * No resolved hospital problems. *    Stemi  -per cardiology     DM2- secondary to noncompliance of medication and diet. Diabetic educator consulted  - ct home meds  - SSI  - a1c > 9  -I would not start the patient on insulin as the patient is noncompliant  -diabetic educator      HTN  -ct home meds          DVT Prophylaxis: lovenox  Diet: DIET CARDIAC; Carb Control: 4 carb choices (60 gms)/meal  Code Status: Full Code        Discharge plan - ct care    The patient and / or the family were informed of the results of any tests, a time was given to answer questions, a plan was proposed and they agreed with plan. Discussed with consulting physicians, nursing and social work     The note was completed using EMR. Every effort was made to ensure accuracy; however, inadvertent computerized transcription errors may be present.        Yanelis Giraldo MD

## 2019-05-14 NOTE — PLAN OF CARE
Problem: Falls - Risk of:  Goal: Will remain free from falls  Description  Will remain free from falls  5/14/2019 2701 by Everitt Holter, RN  Outcome: Met This Shift     Problem: Falls - Risk of:  Goal: Absence of physical injury  Description  Absence of physical injury  5/14/2019 0673 by Everitt Holter, RN  Outcome: Met This Shift     Problem: Discharge Planning:  Goal: Able to tolerate medications  Description  Able to tolerate medications  Outcome: Met This Shift     Problem: Tissue Perfusion - Peripheral, Altered:  Goal: Absence of hematoma at arterial access site  Description  Absence of hematoma at arterial access site  5/14/2019 4961 by Everitt Holter, RN  Outcome: Met This Shift     Problem: Risk for Impaired Skin Integrity  Goal: Tissue integrity - skin and mucous membranes  Description  Structural intactness and normal physiological function of skin and  mucous membranes.   5/14/2019 8292 by Everitt Holter, RN  Outcome: Ongoing     Problem: Discharge Planning:  Goal: Ability to perform activities of daily living will improve  Description  Ability to perform activities of daily living will improve  Outcome: Ongoing     Problem: Discharge Planning:  Goal: Ability to achieve adequate nutritional intake will improve  Description  Ability to achieve adequate nutritional intake will improve  Outcome: Ongoing

## 2019-05-14 NOTE — CARE COORDINATION
[facility]  Diabetes Education   Progress Note       NAME:  Shannon Moran  MEDICAL RECORD NUMBER:  [id number]  AGE: 60 y.o. GENDER: male  : 1960  TODAY'S DATE:  2019    Subjective   Reason for Diabetic Education Evaluation and Assessment: general diabetes education    James Obando agrees to meet with me for diabetes education. He identified his daughter as a support person. He describes managing his diabetes as important to him and a source of frustration and worry.       Visit Type: evaluation      Isha Hurt is a 60 y.o. male referred by:     [x] Physician  [] Nursing  [] Chart Review   [] Other:     PAST MEDICAL HISTORY        Diagnosis Date    Arthritis     CAD (coronary artery disease)     MI in 2017; BELIA to LCX; 70% residual mid-distal LCX    Chronic pain     Diabetes     Herniated disc     Hyperlipidemia     Hypertension     Morbid obesity ([address]     NSTEMI (non-ST elevated myocardial infarction) ([address]     Protein in urine     Sleep apnea     Type 2 diabetes mellitus without complication ([address]     Wears glasses        PAST SURGICAL HISTORY    Past Surgical History:   Procedure Laterality Date    CORONARY ANGIOPLASTY WITH STENT PLACEMENT  2017    BELIA to LCX    EYE SURGERY Bilateral     eye muscle--done as a child    KNEE SURGERY Bilateral     patella repair r/t work related accident       FAMILY HISTORY    Family History   Problem Relation Age of Onset    Cancer Mother     Cancer Other     Diabetes Other        SOCIAL HISTORY    Social History     Tobacco Use    Smoking status: Current Every Day Smoker     Packs/day: 0.50     Types: Cigarettes    Smokeless tobacco: Never Used    Tobacco comment: Trying to cut down   Substance Use Topics    Alcohol use: No    Drug use: No       ALLERGIES    Allergies   Allergen Reactions    Z-Ismael [Azithromycin] Shortness Of Breath    Adalat [Nifedipine] Hives    Canagliflozin Other (See Comments)     Brand names:  Invokana-caused Upper abdominal pain    Penicillins Hives    Exenatide Palpitations     Brand name:  Byetta    Meloxicam Palpitations       MEDICATIONS     morphine  8 mg Intravenous Once    lisinopril  40 mg Oral Daily    isosorbide mononitrate  60 mg Oral Daily    glipiZIDE  10 mg Oral BID AC    lidocaine  1 patch Topical Daily    sodium chloride flush  10 mL Intravenous 2 times per day    metoprolol succinate  50 mg Oral Daily    atorvastatin  80 mg Oral Nightly    aspirin  81 mg Oral Daily    ticagrelor  90 mg Oral BID    insulin lispro  0-12 Units Subcutaneous TID WC    insulin lispro  0-6 Units Subcutaneous Nightly       Objective        Patient Active Problem List   Diagnosis Code    Right wrist sprain S63.501A    NSTEMI (non-ST elevated myocardial infarction) ([facility]) I21.4    Essential hypertension I10    Morbid obesity due to excess calories ([facility]) E66.01    Coronary artery disease involving native coronary artery of native heart with angina pectoris ([facility]) I25.119    Acute coronary syndrome ([facility]) I24.9    CAD S/P percutaneous coronary angioplasty I25.10, Z98.61    ACS (acute coronary syndrome) ([facility]) I24.9    ST elevation myocardial infarction (STEMI) ([facility]) I21.3    Hyperlipidemia LDL goal <70 E78.5    Nicotine dependence F17.200        BP (!) 105/90   Pulse 77   Temp 97.3 °F (36.3 °C) (Axillary)   Resp 17   Ht 6' (1.829 m)   Wt 257 lb 0.9 oz (116.6 kg)   SpO2 97%   BMI 34.86 kg/m²     HgBA1c:    Lab Results   Component Value Date    LABA1C 9.6 05/13/2019       Recent Labs     05/13/19  1300 05/13/19  1634 05/13/19 2020 05/14/19  0824   POCGLU 316* 249* 152* 195*       BUN/Creatinine:    Lab Results   Component Value Date    BUN 13 05/14/2019    CREATININE 0.8 05/14/2019       Assessment        Diabetes Management and Education    Does the patient have a Primary Care Physician? Yes     Does the patient require new medication instruction?  TBD - home Glipizide has been resumed      Does the patient/caregiver monitor Blood Glucoses? Yes. He tests every morning. Recommend increasing testing frequency to before breakfast and 2 hours after meals. Reviewed glycemic control targets, testing frequency and when to call PCP. Level of patient/caregiver understanding able to:        [] Verbalized Understanding   [] Demonstrated Understanding       [] Teach Back       [x] Needs Reinforcement     []  Other:      Does the patient/caregiver follow a Meal Plan? No: Drinks diet soda or water at home. Reviewed importance of eating three meals per day and plate method for consistent carb intake. Level of patient/caregiver understanding able to:       [] Verbalized Understanding   [] Demonstrated Understanding       [] Teach Back       [x] Needs Reinforcement     []  Other:      Does the patient/caregiver understand S/S of Hypoglycemia? No: denies any recent lows. Reviewed symptoms, prevention and treatment. Level of patient/caregiver understanding able to:       [] Verbalized Understanding   [] Demonstrated Understanding       [] Teach Back       [x] Needs Reinforcement     []  Other:                    Does the patient/caregiver understand S/S of Hyperglycemia? No: Worries about high blood sugar but ot sure what to do about it. Reviewed symptoms, prevention and treatment. Level of patient/caregiver understanding able to:        [] Verbalized Understanding   [] Demonstrated Understanding       [] Teach Back       [x] Needs Reinforcement     []  Other:            Plan        Dietary Consult Made:  Yes    Ongoing diabetes education and support. The following educational and support materials were provided:  · My contact information  · ADA booklet - Where Do I Begin?    · Academy of Nutrition and Dietetics handout - Carbohydrate Counting for People with Diabetes  · Blood Glucose Log Book                                           Teaching Time Diabetes Education: 30 minutes     Electronically signed by Andressa Oliveira on 5/14/2019 at 11:28 AM

## 2019-05-14 NOTE — PROGRESS NOTES
Admitted to unit from cath lab. Does have arterial and venous sheaths in place to the right groin. Very drowsy but will awaken and then fall back asleep. Does have 2-l NC in place. Was able to answer questions about his family and give me contact info. Via the venous sheath does have aggrastat in place and normal saline.

## 2019-05-14 NOTE — PLAN OF CARE
Post procedure site check completed. Surgical site is within normal limits and appears to be free of complications. Tagaderm was removed and bandaid applied. All concerns were reviewed and questions answered. Patient verbalized understanding.

## 2019-05-14 NOTE — PLAN OF CARE
Problem: Falls - Risk of:  Goal: Will remain free from falls  Description  Will remain free from falls  Outcome: Ongoing  Goal: Absence of physical injury  Description  Absence of physical injury  Outcome: Ongoing     Problem: Risk for Impaired Skin Integrity  Goal: Tissue integrity - skin and mucous membranes  Description  Structural intactness and normal physiological function of skin and  mucous membranes.   Outcome: Ongoing     Problem: Tissue Perfusion - Peripheral, Altered:  Goal: Absence of hematoma at arterial access site  Description  Absence of hematoma at arterial access site  Outcome: Ongoing

## 2019-05-14 NOTE — PROGRESS NOTES
Staff x2 from the cath lab did come and remove the arterial sheath from his right femoral. Did leave the venous sheath due to no iv access. Pressure was held for approx 20 min. Site now soft with no hematoma present. Venous sheath covered with tegaderm. Iv fluids remain infusing which are the saline and aggrastat.

## 2019-05-14 NOTE — PROGRESS NOTES
tremor. · Hematologic/Lymphatic: No abnormal bruising or bleeding, blood clots or swollen lymph nodes. · Allergic/Immunologic: No nasal congestion or hives. Objective:   BP (!) 161/89   Pulse 59   Temp 97.5 °F (36.4 °C) (Oral)   Resp 14   Ht 6' (1.829 m)   Wt 257 lb 0.9 oz (116.6 kg)   SpO2 100%   BMI 34.86 kg/m²       Intake/Output Summary (Last 24 hours) at 5/14/2019 0901  Last data filed at 5/13/2019 1645  Gross per 24 hour   Intake 626 ml   Output 400 ml   Net 226 ml     Wt Readings from Last 3 Encounters:   05/14/19 257 lb 0.9 oz (116.6 kg)   04/30/19 261 lb 3.9 oz (118.5 kg)   04/15/19 267 lb 13.7 oz (121.5 kg)       Physical Exam:  General: In no acute distress. Awake, alert, and oriented X4. Resting in bed. Tearful and voicing his concerns about his condition  Skin:  Warm and dry. No new appearing rashes or lesions. Neck:  Supple. No JVD or carotid bruit appreciated. Chest: Lungs clear to auscultation. No wheezes/rhonchi/rales  Cardiovascular:  RRR. Normal S1 and S2. No murmur/gallop or rub. Abdomen: obese, soft, nontender,nondistended, +bowel sounds. Extremities:  No LE edema. No clubbing or cyanosis. R groin site unremarkable and without hematoma or ecchymosis. 2+ bilateral radial/DP/PT pulses. Cap refill noted.     Medications:    morphine  8 mg Intravenous Once    lisinopril  40 mg Oral Daily    isosorbide mononitrate  60 mg Oral Daily    glipiZIDE  10 mg Oral BID AC    lidocaine  1 patch Topical Daily    sodium chloride flush  10 mL Intravenous 2 times per day    metoprolol succinate  50 mg Oral Daily    atorvastatin  80 mg Oral Nightly    aspirin  81 mg Oral Daily    ticagrelor  90 mg Oral BID    insulin lispro  0-12 Units Subcutaneous TID WC    insulin lispro  0-6 Units Subcutaneous Nightly      dextrose       nitroGLYCERIN, sodium chloride flush, acetaminophen, sodium chloride, magnesium hydroxide, bisacodyl, ondansetron, hydrALAZINE, glucose, dextrose, glucagon (rDNA), dextrose    Lab Data:  CBC:   Recent Labs     19  0720 19  0509   WBC 11.5*  --    HGB 14.0  --     196     BMP:    Recent Labs     19  0720 19  0509    137   K 4.1 3.7   CO2 24 19*   BUN 18 13   CREATININE 1.1 0.8*     LIVR:   Recent Labs     19  0720   AST 8*   ALT 8*     Results for Rasta Urban (MRN [id number]) as of 2019 09:15   Ref. Range 2019 07:20 2019 12:15   Cholesterol, Total Latest Ref Range: 0 - 199 mg/dL  137   HDL Cholesterol Latest Ref Range: 40 - 60 mg/dL  45   LDL Calculated Latest Ref Range: <100 mg/dL  82   Triglycerides Latest Ref Range: 0 - 150 mg/dL  48   VLDL Cholesterol Calculated Latest Ref Range: Not Established mg/dL  10   Troponin Latest Ref Range: <0.01 ng/mL 0.01      EC2019: SR with inferior lateral ST and TW elevation  2019: SR with inferior-lateral ischemia (inverted TW) and prolonged QT    2019 Cardiac cath:  1. Patent right coronary artery which appears nondominant. 2.  Patent left main trunk. 3.  Mild disease of the mid left anterior descending artery 20% to 30%. 4.  High-grade diagonal stenosis. 5.  A 100% occlusion of the proximal left circumflex artery. 6.  Mild left ventricular systolic dysfunction with estimated EF of  approximately 50%. 7.  Successful angioplasty followed by drug-eluting stent deployment in  the proximal segment with the help of thrombectomy catheter.  _____  stenosis reduced to 0% with a final diameter of 3.20. Residual 70%  stenosis in the mid to distal circumflex artery. 2017 Cardiac Cath:  1. Codominant right coronary artery with collaterals to the distal  circumflex artery. 2.  Patent left main trunk. 3.  Patent left anterior descending artery with luminal irregularity. 4.  A 100% occlusion of proximal circumflex artery with evidence of a  blood clot. 5.  _____ hypokinesis with ejection fraction of 50%.   6.  Successful angioplasty followed by thrombectomy followed by  drug-eluting stent implantation in the proximal circumflex artery,  100% stenosis reduced to 0% using drug-eluting 3.0 x 23 stent. Final  diameter is 3.19 with no residual lesion identified in the proximal  segment and evidence of 70% mid to distal circumflex stenosis. Evidence of embolization in the obtuse marginal branch of the  circumflex. Telemetry: SR without ectopy    Assessment/Plan:    1. Infero-lateral STEMI  -known CAD with prior stent  S/P BELIA to LCX with residual 60-70% distal lesion  -plan is for outpt chemical stress test once seen in follow up to evaluate distal circ  -stent to LCX in 2017  -no recurrent angina  -continue DAPT, BB, imdur and statin  -risk factor modification    2. Essential HTN  -continue medical management    3. Hyperlipidemia, goal LDL < 70  -LDL 82 on lipids this admit  -now on high intensity statin    4. Nicotine addiction  -smoking cessation discussed    5. Diabetes Mellitus, type II  -Rx per Primary team    Will ask for echo today and to increase ambulation. Plan is for discharge tomorrow with plan for outpt stress testing after seen in follow up to evaluate his LCX (distal stenosis 60-70%). Discussed low fat diet, smoking cessation.  Activity/exercise and cardiac rehab discussed    Electronically signed by ARIEL Lorenzo CNP on 5/14/2019 at 9:01 AM

## 2019-05-15 VITALS
OXYGEN SATURATION: 99 % | BODY MASS INDEX: 35.06 KG/M2 | HEART RATE: 73 BPM | HEIGHT: 72 IN | RESPIRATION RATE: 13 BRPM | TEMPERATURE: 97.7 F | SYSTOLIC BLOOD PRESSURE: 116 MMHG | DIASTOLIC BLOOD PRESSURE: 71 MMHG | WEIGHT: 258.82 LBS

## 2019-05-15 PROBLEM — E11.9 TYPE 2 DIABETES MELLITUS (HCC): Status: ACTIVE | Noted: 2019-05-15

## 2019-05-15 LAB
GLUCOSE BLD-MCNC: 229 MG/DL (ref 70–99)
GLUCOSE BLD-MCNC: 277 MG/DL (ref 70–99)
PERFORMED ON: ABNORMAL
PERFORMED ON: ABNORMAL
POC ACT LR: 155 SEC

## 2019-05-15 PROCEDURE — 99239 HOSP IP/OBS DSCHRG MGMT >30: CPT | Performed by: NURSE PRACTITIONER

## 2019-05-15 PROCEDURE — 6370000000 HC RX 637 (ALT 250 FOR IP): Performed by: INTERNAL MEDICINE

## 2019-05-15 PROCEDURE — 94761 N-INVAS EAR/PLS OXIMETRY MLT: CPT

## 2019-05-15 RX ORDER — NITROGLYCERIN 0.4 MG/1
TABLET SUBLINGUAL
Qty: 25 TABLET | Refills: 3 | Status: SHIPPED | OUTPATIENT
Start: 2019-05-15

## 2019-05-15 RX ORDER — ATORVASTATIN CALCIUM 80 MG/1
80 TABLET, FILM COATED ORAL NIGHTLY
Qty: 30 TABLET | Refills: 3 | Status: SHIPPED | OUTPATIENT
Start: 2019-05-15 | End: 2019-10-03

## 2019-05-15 RX ORDER — ATORVASTATIN CALCIUM 80 MG/1
80 TABLET, FILM COATED ORAL DAILY
Status: ON HOLD | COMMUNITY
End: 2019-05-15 | Stop reason: HOSPADM

## 2019-05-15 RX ORDER — ISOSORBIDE MONONITRATE 60 MG/1
60 TABLET, EXTENDED RELEASE ORAL DAILY
Qty: 30 TABLET | Refills: 3 | Status: SHIPPED | OUTPATIENT
Start: 2019-05-16 | End: 2019-05-31 | Stop reason: SDUPTHER

## 2019-05-15 RX ORDER — FLUOXETINE HYDROCHLORIDE 20 MG/1
20 CAPSULE ORAL DAILY
COMMUNITY
End: 2019-10-03

## 2019-05-15 RX ORDER — METOPROLOL SUCCINATE 50 MG/1
50 TABLET, EXTENDED RELEASE ORAL DAILY
Qty: 30 TABLET | Refills: 3 | Status: SHIPPED | OUTPATIENT
Start: 2019-05-16 | End: 2019-05-31 | Stop reason: SDUPTHER

## 2019-05-15 RX ORDER — GLIPIZIDE 10 MG/1
10 TABLET ORAL
Qty: 60 TABLET | Refills: 3 | Status: SHIPPED | OUTPATIENT
Start: 2019-05-15

## 2019-05-15 RX ORDER — GLIMEPIRIDE 2 MG/1
2 TABLET ORAL
Status: ON HOLD | COMMUNITY
End: 2019-05-15 | Stop reason: HOSPADM

## 2019-05-15 RX ORDER — PANTOPRAZOLE SODIUM 40 MG/1
40 TABLET, DELAYED RELEASE ORAL DAILY
COMMUNITY
End: 2019-10-03

## 2019-05-15 RX ORDER — ASPIRIN 81 MG/1
81 TABLET ORAL DAILY
Qty: 30 TABLET | Refills: 3 | Status: SHIPPED | OUTPATIENT
Start: 2019-05-16 | End: 2019-05-31 | Stop reason: SDUPTHER

## 2019-05-15 RX ORDER — METOPROLOL SUCCINATE 25 MG/1
12.5 TABLET, EXTENDED RELEASE ORAL DAILY
Status: ON HOLD | COMMUNITY
End: 2019-05-15 | Stop reason: HOSPADM

## 2019-05-15 RX ORDER — GABAPENTIN 300 MG/1
300 CAPSULE ORAL 3 TIMES DAILY
Status: ON HOLD | COMMUNITY
End: 2019-05-15 | Stop reason: HOSPADM

## 2019-05-15 RX ADMIN — LISINOPRIL 40 MG: 20 TABLET ORAL at 07:49

## 2019-05-15 RX ADMIN — INSULIN LISPRO 6 UNITS: 100 INJECTION, SOLUTION INTRAVENOUS; SUBCUTANEOUS at 12:44

## 2019-05-15 RX ADMIN — METFORMIN HYDROCHLORIDE 1000 MG: 500 TABLET ORAL at 07:49

## 2019-05-15 RX ADMIN — INSULIN LISPRO 4 UNITS: 100 INJECTION, SOLUTION INTRAVENOUS; SUBCUTANEOUS at 07:49

## 2019-05-15 RX ADMIN — METOPROLOL SUCCINATE 50 MG: 50 TABLET, FILM COATED, EXTENDED RELEASE ORAL at 07:49

## 2019-05-15 RX ADMIN — TICAGRELOR 90 MG: 90 TABLET ORAL at 07:49

## 2019-05-15 RX ADMIN — ISOSORBIDE MONONITRATE 60 MG: 60 TABLET ORAL at 07:49

## 2019-05-15 RX ADMIN — GLIPIZIDE 10 MG: 5 TABLET ORAL at 07:49

## 2019-05-15 RX ADMIN — ASPIRIN 81 MG: 81 TABLET, COATED ORAL at 07:49

## 2019-05-15 ASSESSMENT — PAIN SCALES - GENERAL
PAINLEVEL_OUTOF10: 0

## 2019-05-15 NOTE — PROGRESS NOTES
1100: Handoff with [facility]    9046: Shift assessment complete and noted in chart. Ambulated pt to the bathroom tolerated well. Repositioned back into the chair. Pt became tearful and wanting to go home. Pt doesn't like being in the bed and asking why the nurse has to be with him when he gets up. Explained that we do not want him to fall, states \"I wont\". Chair alarm placed on patient and became tearful again. Tele cam in place. Blood glucose 277 covered with 6 units. Lunch ordered for patient. No other needs.

## 2019-05-15 NOTE — DISCHARGE SUMMARY
[address] [place] [address] SUMMARY      Patient ID:  Francisco Moran  [id number] 60 y.o. 1960    Admit date: 2019    Discharge date:  05/15/2019    Admitting Physician: Manny Carcamo MD     Discharge Physician: [place]     Admission Diagnoses:   1. STEMI (Inferolateral)  2. Essential HTN  3. Tobacco use    Discharge Diagnoses:   1. Inferolateral STEMI  -known CAD with prior stent  S/P BELIA to LCX with residual 60-70% distal lesion     2. Essential HTN     3. Hyperlipidemia, goal LDL < 70     4. Nicotine addiction    5. Diabetes Mellitus, type II    Discharged Condition: good    Hospital Course: Navin Farah was admitted with chest pain and STEMI. He is a 61 y/o male with PMH significant for CAD/PCI LCX, HTN, HLP, diabetes mellitus and obesity admitted to [facility] after presenting to [facility] ER with chest pain.  He started with pain the morning of admission and described it as occurring in his upper and mid chest.  Initial ECG showed subtle ST and TW elevation in the inferior and lateral leads. He was placed on nitrates and heparin and transferred to [facility] for cardiac cath. He was taken to cath lab and underwent PTCA and BELIA to 100% occlusion of prox LCX.  LVEF estimated at 50%. He has remained chest pain free, progressively ambulated without complaints and ready for discharge.      Consults:  IP CONSULT TO HOSPITALIST  IP CONSULT TO CARDIOLOGY  IP CONSULT TO CARDIAC REHAB  IP CONSULT TO DIABETES EDUCATOR  IP CONSULT TO DIETITIAN  IP CONSULT TO DIABETES EDUCATOR    Significant Diagnostic Studies:   EC2019: SR with inferior lateral ST and TW elevation  2019: SR with inferior-lateral ischemia (inverted TW) and prolonged QT     Echo 2019:  Left ventricular cavity size is normal.   There is moderate to severe concentric left ventricular hypertrophy.   Ejection fraction is visually estimated to be 50-55%.   Normal right ventricular size.     2019 Cardiac cath:  1.  Patent right coronary artery which appears nondominant. 2.  Patent left main trunk. 3.  Mild disease of the mid left anterior descending artery 20% to 30%. 4.  High-grade diagonal stenosis. 5.  A 100% occlusion of the proximal left circumflex artery. 6.  Mild left ventricular systolic dysfunction with estimated EF of  approximately 50%. 7.  Successful angioplasty followed by drug-eluting stent deployment in  the proximal segment with the help of thrombectomy catheter.  _____  stenosis reduced to 0% with a final diameter of 3.20.  Residual 70%  stenosis in the mid to distal circumflex artery.     8/16/2017 Cardiac Cath:  1.  Codominant right coronary artery with collaterals to the distal  circumflex artery. 2.  Patent left main trunk. 3.  Patent left anterior descending artery with luminal irregularity. 4.  A 100% occlusion of proximal circumflex artery with evidence of a  blood clot. 5.  _____ hypokinesis with ejection fraction of 50%. 6.  Successful angioplasty followed by thrombectomy followed by  drug-eluting stent implantation in the proximal circumflex artery,  100% stenosis reduced to 0% using drug-eluting 3.0 x 23 stent.  Final  diameter is 3.19 with no residual lesion identified in the proximal  segment and evidence of 70% mid to distal circumflex stenosis. Evidence of embolization in the obtuse marginal branch of the  circumflex.     Labs:   Lab Results   Component Value Date    CREATININE 0.8 (L) 05/14/2019    BUN 13 05/14/2019     05/14/2019    K 3.7 05/14/2019     05/14/2019    CO2 19 (L) 05/14/2019      Lab Results   Component Value Date    WBC 11.5 (H) 05/13/2019    HGB 14.0 05/13/2019    HCT 41.7 05/13/2019    MCV 88.5 05/13/2019     05/14/2019      Lab Results   Component Value Date    INR 0.93 08/15/2017    PROTIME 10.8 08/15/2017      Lab Results   Component Value Date    BNP 15 06/29/2013       Disposition: home    Patient Instructions:    Dawn Bloch

## 2019-05-15 NOTE — PROGRESS NOTES
[facility]   Daily Progress Note      Admit Date:  5/13/2019    Reason for follow up visit: Inferolateral MI    CC: \"I'm ok. I'm ready to go home. \"    59 y/o male with PMH significant for CAD/PCI LCX, HTN, HLP, diabetes mellitus and obesity admitted to [facility] after presenting to [facility] ER with chest pain. He started with pain the morning of admission and described it as occurring in his upper and mid chest.  Initial ECG showed subtle ST and TW elevation in the inferior and lateral leads. He was placed on nitrates and heparin and transferred to [facility] for cardiac cath. He was taken to cath lab and underwent PTCA and BELIA to 100% occlusion of prox LCX. LVEF estimated at 50%. He has remained chest pain free, progressively ambulated without complaints and ready for discharge. Interval History:  No further chest pain. Denies SOB, cough, palpitations or dizziness  BP stable. Anxious to go home    Subjective:  Pt with no acute overnight cardiac events. Review of Systems:   · Constitutional: no unanticipated weight loss. There's been no change in energy level, sleep pattern, or activity level. No fevers, chills. · Eyes: No visual changes or diplopia. No scleral icterus. · ENT: No Headaches, hearing loss or vertigo. No mouth sores or sore throat. · Cardiovascular: as reviewed in HPI  · Respiratory: No cough or wheezing, no sputum production. No hematemesis. · Gastrointestinal: No abdominal pain, appetite loss, blood in stools. No change in bowel or bladder habits. · Genitourinary: No dysuria, trouble voiding, or hematuria. · Musculoskeletal:  No gait disturbance, no joint complaints. · Integumentary: No rash or pruritis. · Neurological: No headache, diplopia, change in muscle strength, numbness or tingling. · Psychiatric: No anxiety or depression. · Endocrine: No temperature intolerance. No excessive thirst, fluid intake, or urination. No tremor.   · Hematologic/Lymphatic: No abnormal 11.5*  --    HGB 14.0  --     196     BMP:    Recent Labs     19  0720 19  0509    137   K 4.1 3.7   CO2 24 19*   BUN 18 13   CREATININE 1.1 0.8*     LIVR:   Recent Labs     19  0720   AST 8*   ALT 8*       HDL  45  LDL 82  TG 48    EC2019: SR with inferior lateral ST and TW elevation  2019: SR with inferior-lateral ischemia (inverted TW) and prolonged QT    Echo 2019:  Left ventricular cavity size is normal.   There is moderate to severe concentric left ventricular hypertrophy.   Ejection fraction is visually estimated to be 50-55%.   Normal right ventricular size.     2019 Cardiac cath:  1.  Patent right coronary artery which appears nondominant. 2.  Patent left main trunk. 3.  Mild disease of the mid left anterior descending artery 20% to 30%. 4.  High-grade diagonal stenosis. 5.  A 100% occlusion of the proximal left circumflex artery. 6.  Mild left ventricular systolic dysfunction with estimated EF of  approximately 50%. 7.  Successful angioplasty followed by drug-eluting stent deployment in  the proximal segment with the help of thrombectomy catheter.  _____  stenosis reduced to 0% with a final diameter of 3.20.  Residual 70%  stenosis in the mid to distal circumflex artery.     2017 Cardiac Cath:  1.  Codominant right coronary artery with collaterals to the distal  circumflex artery. 2.  Patent left main trunk. 3.  Patent left anterior descending artery with luminal irregularity. 4.  A 100% occlusion of proximal circumflex artery with evidence of a  blood clot. 5.  _____ hypokinesis with ejection fraction of 50%.   6.  Successful angioplasty followed by thrombectomy followed by  drug-eluting stent implantation in the proximal circumflex artery,  100% stenosis reduced to 0% using drug-eluting 3.0 x 23 stent.  Final  diameter is 3.19 with no residual lesion identified in the proximal  segment and evidence of 70% mid to distal circumflex stenosis. Evidence of embolization in the obtuse marginal branch of the  circumflex. Telemetry:Sinus rhythm without ectopy    Assessment/Plan:    1. Inferolateral STEMI  -known CAD with prior stent  S/P BELIA to LCX with residual 60-70% distal lesion  -plan is for outpt chemical stress test once seen in follow up to evaluate distal circ  -stent to LCX in 2017  -angina has remained quiet  -continue DAPT, BB, imdur and statin  -risk factor modification     2. Essential HTN  -continue medical management  -much better controlled     3. Hyperlipidemia, goal LDL < 70  -LDL 82 on lipids this admit  -now on high intensity statin  -will follow up in 12 weeks     4. Nicotine addiction  -discussed smoking cessation     5. Diabetes Mellitus, type II  -Rx per Primary team    Stable from cardiac standpoint and ok to discharge home. Follow up with cardiology: appt with D. Enzweiler, CNP on 5/21/2019 @ 2:20 PM    Emphasized and discussed strategies for smoking cessation (> 3-5 minutes of counseling given)    Low fat/low sodium diet, blood sugar control, post angiogram activity/exercise limitations discussed with pt.     Discharge Meds:  ASA 81 mg daily  Atorvastatin 80 mg nightly  Glipizide 10 mg BID  Imdur 60 mg daily  Lidocaine patch daily  Lisinopril 40 mg daily  Metformin 1000 mg BID  Toprol Xl 50 mg daily  Brilinta 90 mg BID    Electronically signed by ARIEL Suazo CNP on 5/15/2019 at 1:39 PM

## 2019-05-15 NOTE — PROGRESS NOTES
1910: Report received from Nabor Desai, [address]: Shift assessment complete. VSS on room air. Patient is alert and oriented x4 and denies pain at this time. R groin site soft and free of hematoma. Medications given per orders, patient completed dinner. Verbalizes no other needs at this time. 2130: Patient continues to get out of bed without assistance. Telecam sitter placed at the bedside for fall risk. 0000: VSS on room air, denies pain. Patient using urinal as indicated. Telecam remains at the bedside      0640: Reassessment complete. VSS on room air, denies pain at this time. R groin site soft and free of hematoma. Patient in bed with eyes closed but awaken to voice. Verbalize no other needs at this time.      0700: Report given to [facility]

## 2019-05-15 NOTE — PROGRESS NOTES
Nutrition Education    Type and Reason for Visit: Initial, Consult(ed)    Patient declined ed needs. Reinforced principles as well as importance in following diet prescribed. Pt agreed to try    · Verbally reviewed following information with Patient:   · Written educational materials provided. · Contact name and number provided. · Refer to Patient Education activity for more details.     Electronically signed by Dipti Guan RD, LD on 5/15/19 at 12:02 PM    Contact Number: [phone]

## 2019-05-15 NOTE — PROGRESS NOTES
0745: Shift assessment completed. A/O x4 VSS on RA. NSR on monitor. Denies pain. Telesitter in place for pt safety. 1015: Called pt's pharmacy ([facility] [phone]) to verify med list. Several medications have been ordered, but not picked up. Called pt's PCP (Dr. Deloris Maciel) and updated med list completely.    1100: Handoff with Tigre Nash RN Medical Necessity Information: It is in your best interest to select a reason for this procedure from the list below. All of these items fulfill various CMS LCD requirements except the new and changing color options. Include Z78.9 (Other Specified Conditions Influencing Health Status) As An Associated Diagnosis?: No Detail Level: Detailed Post-Care Instructions: I reviewed with the patient in detail post-care instructions. Patient is to wear sunprotection, and avoid picking at any of the treated lesions. Pt may apply Vaseline to crusted or scabbing areas. Consent: The patient's consent was obtained including but not limited to risks of crusting, scabbing, blistering, scarring, darker or lighter pigmentary change, recurrence, incomplete removal and infection. Medical Necessity Clause: This procedure was medically necessary because the lesions that were treated were: Anesthesia Volume In Cc: 0.5 Treatment Number (Will Not Render If 0): 1

## 2019-05-15 NOTE — PROGRESS NOTES
Progress Note  Admit Date: 2019      PCP: Tristen Landon MD     CC: F/U for chest  pain    SUBJECTIVE / Interval History:  Feels ok  Poor insight         Allergies  Z-debbie [azithromycin]; Adalat [nifedipine]; Canagliflozin; Penicillins; Exenatide; and Meloxicam    Medications    Scheduled Meds:   metFORMIN  1,000 mg Oral BID WC    morphine  8 mg Intravenous Once    lisinopril  40 mg Oral Daily    isosorbide mononitrate  60 mg Oral Daily    glipiZIDE  10 mg Oral BID AC    lidocaine  1 patch Topical Daily    sodium chloride flush  10 mL Intravenous 2 times per day    metoprolol succinate  50 mg Oral Daily    atorvastatin  80 mg Oral Nightly    aspirin  81 mg Oral Daily    ticagrelor  90 mg Oral BID    insulin lispro  0-12 Units Subcutaneous TID WC    insulin lispro  0-6 Units Subcutaneous Nightly     Continuous Infusions:   dextrose         PRN Meds:  perflutren lipid microspheres, nitroGLYCERIN, sodium chloride flush, acetaminophen, sodium chloride, magnesium hydroxide, bisacodyl, ondansetron, hydrALAZINE, glucose, dextrose, glucagon (rDNA), dextrose    Vitals    TEMPERATURE:  Current - Temp: 97.8 °F (36.6 °C); Max - Temp  Av.6 °F (36.4 °C)  Min: 97.3 °F (36.3 °C)  Max: 97.8 °F (36.6 °C)  RESPIRATIONS RANGE: Resp  Av.3  Min: 14  Max: 21  PULSE RANGE: Pulse  Av  Min: 65  Max: 77  BLOOD PRESSURE RANGE:  Systolic (66JHZ), OFA:294 , Min:105 , WQU:979   ; Diastolic (79NNP), EEP:39, Min:58, Max:90    PULSE OXIMETRY RANGE: SpO2  Av.9 %  Min: 96 %  Max: 100 %  24HR INTAKE/OUTPUT:      Intake/Output Summary (Last 24 hours) at 5/15/2019 0724  Last data filed at 5/15/2019 0500  Gross per 24 hour   Intake 1800 ml   Output 1875 ml   Net -75 ml       Exam:    Gen: No distress. Eyes: PERRL. No sclera icterus. No conjunctival injection. ENT: No discharge. Pharynx clear. External appearance of ears and nose normal.  Neck: Trachea midline. No obvious mass.     Resp: No accessory muscle use. No crackles. No wheezes. No rhonchi. No dullness on percussion. CV: Regular rate. Regular rhythm. No murmur or rub. No edema. GI: Non-tender. Non-distended. No hernia. Skin: Warm, dry, normal texture and turgor. No nodule on exposed extremities. Lymph: No cervical LAD. No supraclavicular LAD. M/S: No cyanosis. No clubbing. No joint deformity. Neuro: Moves all four extremities. CN 2-12 tested, no defect noted. Psych: Oriented x 3. No anxiety. Awake. Alert. Intact judgement and insight. Data    LABS  CBC:   Recent Labs     05/13/19  0720 05/14/19  0509   WBC 11.5*  --    HGB 14.0  --    HCT 41.7  --    MCV 88.5  --     196     BMP:   Recent Labs     05/13/19  0720 05/14/19  0509    137   K 4.1 3.7    104   CO2 24 19*   BUN 18 13   CREATININE 1.1 0.8*     POC GLUCOSE:    Recent Labs     05/13/19  2020 05/14/19  0824 05/14/19  1132 05/14/19  1614 05/14/19  2031   POCGLU 152* 195* 315* 237* 233*     LIVER PROFILE:   Recent Labs     05/13/19  0720   AST 8*   ALT 8*   LABALBU 3.8   BILITOT 0.3   ALKPHOS 68     PT/INR: No results for input(s): PROTIME, INR in the last 72 hours. APTT: No results for input(s): APTT in the last 72 hours. UA:No results for input(s): NITRITE, COLORU, PHUR, LABCAST, WBCUA, RBCUA, MUCUS, TRICHOMONAS, YEAST, BACTERIA, CLARITYU, SPECGRAV, LEUKOCYTESUR, UROBILINOGEN, BILIRUBINUR, BLOODU, GLUCOSEU, KETUA, AMORPHOUS in the last 72 hours. Microbiology:  Wound Culture: No results for input(s): WNDABS, ORG in the last 72 hours. Invalid input(s):  LABGRAM  Nasal Culture: No results for input(s): ORG, MRSAPCR in the last 72 hours. Blood Culture: No results for input(s): BC, BLOODCULT2 in the last 72 hours. Fungal Culture:   No results for input(s): FUNGSM in the last 72 hours. No results for input(s): FUNCXBLD in the last 72 hours.   CSF Culture:  No results for input(s): COLORCSF, APPEARCSF, CFTUBE, CLOTCSF, WBCCSF, RBCCSF, NEUTCSF, NUMCELLSCSF, LYMPHSCSF, MONOCSF, GLUCCSF, VOLCSF in the last 72 hours. Respiratory Culture:  No results for input(s): Adela Garciater in the last 72 hours. AFB:No results for input(s): AFBSMEAR in the last 72 hours. Urine Culture  No results for input(s): LABURIN in the last 72 hours. RADIOLOGY:    XR CHEST PORTABLE   Final Result   No acute process. CONSULTS:    IP CONSULT TO HOSPITALIST  IP CONSULT TO CARDIOLOGY  IP CONSULT TO CARDIAC REHAB  IP CONSULT TO DIABETES EDUCATOR  IP CONSULT TO DIETITIAN    ASSESSMENT AND PLAN:      Active Problems:    CAD S/P percutaneous coronary angioplasty    ACS (acute coronary syndrome) ([facility] [address]    ST elevation myocardial infarction (STEMI) ([facility] [address]    Hyperlipidemia LDL goal <70    Nicotine dependence  Resolved Problems:    * No resolved hospital problems. *    Stemi  -per cardiology     DM2- secondary to noncompliance of medication and diet. Diabetic educator consulted- resume metformin   - ct home meds  - SSI  - a1c > 9  -I would not start the patient on insulin as the patient is noncompliant  -diabetic educator      HTN  -ct home meds          DVT Prophylaxis: lovenox  Diet: DIET CARDIAC; Carb Control: 4 carb choices (60 gms)/meal  Code Status: Full Code        Discharge plan - ct care    The patient and / or the family were informed of the results of any tests, a time was given to answer questions, a plan was proposed and they agreed with plan. Discussed with consulting physicians, nursing and social work     The note was completed using EMR. Every effort was made to ensure accuracy; however, inadvertent computerized transcription errors may be present.        Venancio Savage MD

## 2019-05-15 NOTE — PLAN OF CARE
Problem: Activity Intolerance:  Goal: Oxygen saturation during activity within specified parameters  Description  Oxygen saturation during activity within specified parameters  Outcome: Met This Shift     Problem: Tissue Perfusion - Peripheral, Altered:  Goal: Absence of hematoma at arterial access site  Description  Absence of hematoma at arterial access site  Outcome: Met This Shift     Problem: Falls - Risk of:  Goal: Will remain free from falls  Description  Will remain free from falls  Outcome: Ongoing  Goal: Absence of physical injury  Description  Absence of physical injury  Outcome: Ongoing     Problem: Discharge Planning:  Goal: Ability to perform activities of daily living will improve  Description  Ability to perform activities of daily living will improve  Outcome: Ongoing     Problem:  Activity Intolerance:  Goal: Able to perform prescribed physical activity  Description  Able to perform prescribed physical activity  Outcome: Ongoing

## 2019-05-15 NOTE — CONSULTS
Nutrition Assessment (Low Risk)    Type and Reason for Visit: Initial, Consult(ed)    Nutrition Recommendations:   Encouraged to contact Dietitian should any questions arise regarding diet  Encouraged better diet compliance       Nutrition Assessment:  Patient assessed for nutritional risk. Deemed to be at low risk at this time. Will continue to monitor for changes in status. pt at risk for nutrition compromise r/t increased needs, complaince, altered labs r/t s/p procedure, reports of non compliance with MD orders, endocrine dysfunction with risk for delayed healing. Diet advanced to cardiac / [place] [address]. Pt reported no issues with meals or service. Declined ed needs. Reinforced principles as well as importance of healthy diet compliance to better manage blood sugars & cardiac status. Pt agreed to try.     Malnutrition Assessment:  · Malnutrition Status: No malnutrition    Nutrition Risk Level   Risk Level: Low    Nutrition Diagnosis:   · Problem: (limited compliance to MD orders)  · Etiology: Cardiac dysfunction, Endocrine dysfunction    Signs and symptoms: Patient report of    Nutrition Intervention:  Food and/or Delivery: Continue current diet  Nutrition Education/Counseling/Coordination of Care:  Continued Inpatient Monitoring, Education declined      Electronically signed by Delonte He RD, LD on 5/15/19 at 11:50 AM    Contact Number: [phone]

## 2019-05-31 RX ORDER — ASPIRIN 81 MG/1
81 TABLET ORAL DAILY
Qty: 30 TABLET | Refills: 0 | Status: SHIPPED | OUTPATIENT
Start: 2019-05-31

## 2019-05-31 RX ORDER — ISOSORBIDE MONONITRATE 60 MG/1
60 TABLET, EXTENDED RELEASE ORAL DAILY
Qty: 30 TABLET | Refills: 0 | Status: SHIPPED | OUTPATIENT
Start: 2019-05-31

## 2019-05-31 RX ORDER — METOPROLOL SUCCINATE 50 MG/1
50 TABLET, EXTENDED RELEASE ORAL DAILY
Qty: 30 TABLET | Refills: 0 | Status: SHIPPED | OUTPATIENT
Start: 2019-05-31 | End: 2019-10-03 | Stop reason: SDUPTHER

## 2019-10-03 ENCOUNTER — HOSPITAL ENCOUNTER (EMERGENCY)
Age: 59
Discharge: HOME | End: 2019-10-03
Attending: EMERGENCY MEDICINE
Payer: COMMERCIAL

## 2019-10-03 ENCOUNTER — APPOINTMENT (OUTPATIENT)
Dept: GENERAL RADIOLOGY | Age: 59
End: 2019-10-03
Payer: COMMERCIAL

## 2019-10-03 VITALS
SYSTOLIC BLOOD PRESSURE: 156 MMHG | TEMPERATURE: 98.5 F | OXYGEN SATURATION: 98 % | DIASTOLIC BLOOD PRESSURE: 85 MMHG | BODY MASS INDEX: 39.42 KG/M2 | RESPIRATION RATE: 20 BRPM | WEIGHT: 291 LBS | HEART RATE: 58 BPM | HEIGHT: 72 IN

## 2019-10-03 DIAGNOSIS — R60.0 BILATERAL LOWER EXTREMITY EDEMA: Primary | ICD-10-CM

## 2019-10-03 LAB
ANION GAP SERPL CALCULATED.3IONS-SCNC: 11 MMOL/L (ref 3–16)
APTT: 19.6 SEC (ref 26–36)
BASOPHILS ABSOLUTE: 0 K/UL (ref 0–0.2)
BASOPHILS RELATIVE PERCENT: 0.5 %
BILIRUBIN URINE: NEGATIVE
BLOOD, URINE: ABNORMAL
BUN BLDV-MCNC: 15 MG/DL (ref 7–20)
CALCIUM SERPL-MCNC: 8.8 MG/DL (ref 8.3–10.6)
CHLORIDE BLD-SCNC: 108 MMOL/L (ref 99–110)
CLARITY: CLEAR
CO2: 24 MMOL/L (ref 21–32)
COLOR: ABNORMAL
CREAT SERPL-MCNC: 0.8 MG/DL (ref 0.8–1.3)
EOSINOPHILS ABSOLUTE: 0.1 K/UL (ref 0–0.6)
EOSINOPHILS RELATIVE PERCENT: 0.9 %
EPITHELIAL CELLS, UA: NORMAL /HPF
GFR AFRICAN AMERICAN: >60
GFR NON-AFRICAN AMERICAN: >60
GLUCOSE BLD-MCNC: 241 MG/DL (ref 70–99)
GLUCOSE URINE: 250 MG/DL
HCT VFR BLD CALC: 42.1 % (ref 40.5–52.5)
HEMOGLOBIN: 13.8 G/DL (ref 13.5–17.5)
INR BLD: 1.19 (ref 0.86–1.14)
KETONES, URINE: NEGATIVE MG/DL
LEUKOCYTE ESTERASE, URINE: NEGATIVE
LYMPHOCYTES ABSOLUTE: 2 K/UL (ref 1–5.1)
LYMPHOCYTES RELATIVE PERCENT: 24 %
MCH RBC QN AUTO: 28.8 PG (ref 26–34)
MCHC RBC AUTO-ENTMCNC: 32.7 G/DL (ref 31–36)
MCV RBC AUTO: 88.1 FL (ref 80–100)
MICROSCOPIC EXAMINATION: YES
MONOCYTES ABSOLUTE: 0.8 K/UL (ref 0–1.3)
MONOCYTES RELATIVE PERCENT: 9.7 %
NEUTROPHILS ABSOLUTE: 5.5 K/UL (ref 1.7–7.7)
NEUTROPHILS RELATIVE PERCENT: 64.9 %
NITRITE, URINE: NEGATIVE
PDW BLD-RTO: 15.5 % (ref 12.4–15.4)
PH UA: 6 (ref 5–8)
PLATELET # BLD: 118 K/UL (ref 135–450)
PMV BLD AUTO: 9.7 FL (ref 5–10.5)
POTASSIUM REFLEX MAGNESIUM: 4 MMOL/L (ref 3.5–5.1)
PRO-BNP: 2356 PG/ML (ref 0–124)
PROTEIN UA: 100 MG/DL
PROTHROMBIN TIME: 13.5 SEC (ref 9.8–13)
RBC # BLD: 4.78 M/UL (ref 4.2–5.9)
RBC UA: NORMAL /HPF (ref 0–2)
SODIUM BLD-SCNC: 143 MMOL/L (ref 136–145)
SPECIFIC GRAVITY UA: 1.01 (ref 1–1.03)
TROPONIN: <0.01 NG/ML
URINE REFLEX TO CULTURE: ABNORMAL
URINE TYPE: ABNORMAL
UROBILINOGEN, URINE: 2 E.U./DL
WBC # BLD: 8.4 K/UL (ref 4–11)
WBC UA: NORMAL /HPF (ref 0–5)

## 2019-10-03 PROCEDURE — 93005 ELECTROCARDIOGRAM TRACING: CPT | Performed by: NURSE PRACTITIONER

## 2019-10-03 PROCEDURE — 84484 ASSAY OF TROPONIN QUANT: CPT

## 2019-10-03 PROCEDURE — 85730 THROMBOPLASTIN TIME PARTIAL: CPT

## 2019-10-03 PROCEDURE — 6370000000 HC RX 637 (ALT 250 FOR IP): Performed by: NURSE PRACTITIONER

## 2019-10-03 PROCEDURE — 83880 ASSAY OF NATRIURETIC PEPTIDE: CPT

## 2019-10-03 PROCEDURE — 85025 COMPLETE CBC W/AUTO DIFF WBC: CPT

## 2019-10-03 PROCEDURE — 6360000002 HC RX W HCPCS: Performed by: NURSE PRACTITIONER

## 2019-10-03 PROCEDURE — 36415 COLL VENOUS BLD VENIPUNCTURE: CPT

## 2019-10-03 PROCEDURE — 80048 BASIC METABOLIC PNL TOTAL CA: CPT

## 2019-10-03 PROCEDURE — 99284 EMERGENCY DEPT VISIT MOD MDM: CPT

## 2019-10-03 PROCEDURE — 81001 URINALYSIS AUTO W/SCOPE: CPT

## 2019-10-03 PROCEDURE — 85610 PROTHROMBIN TIME: CPT

## 2019-10-03 PROCEDURE — 96374 THER/PROPH/DIAG INJ IV PUSH: CPT

## 2019-10-03 PROCEDURE — 71046 X-RAY EXAM CHEST 2 VIEWS: CPT

## 2019-10-03 RX ORDER — ASPIRIN 81 MG/1
81 TABLET ORAL DAILY
Qty: 30 TABLET | Refills: 0 | Status: SHIPPED | OUTPATIENT
Start: 2019-10-03

## 2019-10-03 RX ORDER — FUROSEMIDE 20 MG/1
20 TABLET ORAL 2 TIMES DAILY
Qty: 28 TABLET | Refills: 0 | Status: SHIPPED | OUTPATIENT
Start: 2019-10-03 | End: 2019-10-17

## 2019-10-03 RX ORDER — METOPROLOL TARTRATE 50 MG/1
50 TABLET, FILM COATED ORAL ONCE
Status: COMPLETED | OUTPATIENT
Start: 2019-10-03 | End: 2019-10-03

## 2019-10-03 RX ORDER — ISOSORBIDE MONONITRATE 30 MG/1
30 TABLET, EXTENDED RELEASE ORAL DAILY
Status: DISCONTINUED | OUTPATIENT
Start: 2019-10-03 | End: 2019-10-03

## 2019-10-03 RX ORDER — HYDROCODONE BITARTRATE AND ACETAMINOPHEN 5; 325 MG/1; MG/1
1 TABLET ORAL ONCE
Status: COMPLETED | OUTPATIENT
Start: 2019-10-03 | End: 2019-10-03

## 2019-10-03 RX ORDER — METOPROLOL SUCCINATE 50 MG/1
50 TABLET, EXTENDED RELEASE ORAL DAILY
Qty: 30 TABLET | Refills: 0 | Status: SHIPPED | OUTPATIENT
Start: 2019-10-03

## 2019-10-03 RX ORDER — POTASSIUM CHLORIDE 750 MG/1
10 TABLET, EXTENDED RELEASE ORAL 2 TIMES DAILY
Qty: 28 TABLET | Refills: 0 | Status: SHIPPED | OUTPATIENT
Start: 2019-10-03 | End: 2019-10-17

## 2019-10-03 RX ORDER — FUROSEMIDE 10 MG/ML
20 INJECTION INTRAMUSCULAR; INTRAVENOUS ONCE
Status: COMPLETED | OUTPATIENT
Start: 2019-10-03 | End: 2019-10-03

## 2019-10-03 RX ORDER — ISOSORBIDE MONONITRATE 60 MG/1
60 TABLET, EXTENDED RELEASE ORAL DAILY
Status: DISCONTINUED | OUTPATIENT
Start: 2019-10-03 | End: 2019-10-03

## 2019-10-03 RX ADMIN — FUROSEMIDE 20 MG: 10 INJECTION, SOLUTION INTRAMUSCULAR; INTRAVENOUS at 19:04

## 2019-10-03 RX ADMIN — HYDROCODONE BITARTRATE AND ACETAMINOPHEN 1 TABLET: 5; 325 TABLET ORAL at 16:35

## 2019-10-03 RX ADMIN — METOPROLOL TARTRATE 50 MG: 50 TABLET ORAL at 16:20

## 2019-10-03 ASSESSMENT — PAIN SCALES - GENERAL
PAINLEVEL_OUTOF10: 10
PAINLEVEL_OUTOF10: 10

## 2019-10-03 ASSESSMENT — PAIN DESCRIPTION - LOCATION: LOCATION: LEG

## 2019-10-03 ASSESSMENT — PAIN DESCRIPTION - DESCRIPTORS: DESCRIPTORS: BURNING;ACHING

## 2019-10-03 ASSESSMENT — PAIN DESCRIPTION - PAIN TYPE: TYPE: ACUTE PAIN

## 2019-10-03 ASSESSMENT — PAIN DESCRIPTION - ORIENTATION: ORIENTATION: RIGHT;LEFT

## 2019-10-04 LAB
EKG ATRIAL RATE: 71 BPM
EKG DIAGNOSIS: NORMAL
EKG P AXIS: 44 DEGREES
EKG P-R INTERVAL: 156 MS
EKG Q-T INTERVAL: 400 MS
EKG QRS DURATION: 86 MS
EKG QTC CALCULATION (BAZETT): 434 MS
EKG R AXIS: -13 DEGREES
EKG T AXIS: 161 DEGREES
EKG VENTRICULAR RATE: 71 BPM

## 2019-10-04 PROCEDURE — 93010 ELECTROCARDIOGRAM REPORT: CPT | Performed by: INTERNAL MEDICINE

## 2020-09-14 NOTE — ED PROVIDER NOTES
Last seen for PPA on 11/22/19   Left message to call back    relief after 1 dose, call 911., Disp-25 tablet, R-3Normal             ALLERGIES     Z-debbie [azithromycin]; Adalat [nifedipine]; Canagliflozin; Penicillins;  Exenatide; and Meloxicam    FAMILY HISTORY       Family History   Problem Relation Age of Onset    Cancer Mother     Cancer Other     Diabetes Other           SOCIAL HISTORY       Social History     Socioeconomic History    Marital status:      Spouse name: None    Number of children: None    Years of education: None    Highest education level: None   Occupational History    Occupation: Disability/Retirment   Social Needs    Financial resource strain: None    Food insecurity:     Worry: None     Inability: None    Transportation needs:     Medical: None     Non-medical: None   Tobacco Use    Smoking status: Current Every Day Smoker     Packs/day: 0.50     Types: Cigarettes    Smokeless tobacco: Never Used    Tobacco comment: Trying to cut down   Substance and Sexual Activity    Alcohol use: No    Drug use: No    Sexual activity: Yes     Partners: Female     Comment:    Lifestyle    Physical activity:     Days per week: None     Minutes per session: None    Stress: None   Relationships    Social connections:     Talks on phone: None     Gets together: None     Attends Tenriism service: None     Active member of club or organization: None     Attends meetings of clubs or organizations: None     Relationship status: None    Intimate partner violence:     Fear of current or ex partner: None     Emotionally abused: None     Physically abused: None     Forced sexual activity: None   Other Topics Concern    None   Social History Narrative    None       SCREENINGS             PHYSICAL EXAM    (up to 7 for level 4, 8 or more for level 5)     ED Triage Vitals [04/30/19 1118]   BP Temp Temp Source Pulse Resp SpO2 Height Weight   (!) 195/103 98.2 °F (36.8 °C) Oral 80 16 95 % 6' (1.829 m) 261 lb 3.9 oz (118.5 kg)       Physical Exam Constitutional: Awake and alert and oriented to person place and time. No apparent distress. Head: No visible evidence of trauma. Normocephalic. Eyes: Pupils equal and reactive. No photophobia. Conjunctiva normal.    HENT: Oral mucosa moist.  Airway patent. Neck:  Soft and supple. Heart:  Regular rate and rhythm. No murmur. Lungs:  Clear to auscultation. No wheezes, rales, or ronchi. No conversational dyspnea or accessory muscle use. Abdomen:  Soft, nondistended, bowel sounds present. Nontender. No guarding rigidity or rebound. No masses. Musculoskeletal: Extremities non-tender with full range of motion. radial and dorsalis pedis pulses were equal bilaterally. No calf tenderness erythema or edema. Straight leg raising was present on the left side with radicular pain down the posterior lateral aspect of the left leg to the level of the knee. Deep tendon reflexes were 2 over 4 bilaterally in the patellar and Achilles tendon. Great toe extensor strength is 5 over 5 bilaterally. No saddle anesthesia. No incontinence, bowel or bladder difficulties. The patient was acutely tender in the left lower lumbar spine in the paravertebral musculature with associated spasm. His pain was worsened with range of motion especially going from supine to sitting position. No point or axial tenderness. Neurological: Alert and oriented x 3. Speech clear. Cranial nerves II-XII intact. No facial droop. No acute focal motor or sensory deficits. he was able to walk without difficulty. Skin: Skin is warm and dry. No rash. Lymphatic:  No lympadenopathy. Psychiatric: Normal mood and affect.  Behavior is normal.         DIAGNOSTIC RESULTS     EKG: All EKG's are interpreted by the Emergency Department Physician who either signs or Co-signs this chart in the absence of a cardiologist.        RADIOLOGY:   Non-plain film images such as CT, Ultrasound and MRI are read by the radiologist. Plain radiographic images are visualized and preliminarily interpreted by the emergency physician with the below findings:        Interpretation per the Radiologist below, if available at the time of this note:    CT ABDOMEN PELVIS WO CONTRAST Additional Contrast? None   Final Result   No acute findings. ED BEDSIDE ULTRASOUND:   Performed by ED Physician - none    LABS:  Labs Reviewed   COMPREHENSIVE METABOLIC PANEL W/ REFLEX TO MG FOR LOW K - Abnormal; Notable for the following components:       Result Value    Glucose 267 (*)     CREATININE 0.7 (*)     AST 10 (*)     All other components within normal limits    Narrative:     Performed at:  [facility]  [address] [place], [facility]   Phone [phone]   LIPASE - Abnormal; Notable for the following components:    Lipase 12.0 (*)     All other components within normal limits    Narrative:     Performed at:  [facility]  [address] [place], [facility]   Phone [phone]   CBC WITH AUTO DIFFERENTIAL    Narrative:     Performed at:  [facility]  [address] [place], [facility]   Phone [phone]       All other labs were within normal range or not returned as of this dictation. EMERGENCY DEPARTMENT COURSE and DIFFERENTIAL DIAGNOSIS/MDM:   Vitals:    Vitals:    04/30/19 1118   BP: (!) 195/103   Pulse: 80   Resp: 16   Temp: 98.2 °F (36.8 °C)   TempSrc: Oral   SpO2: 95%   Weight: 261 lb 3.9 oz (118.5 kg)   Height: 6' (1.829 m)       The patient presented with low back pain. At the time of initial exam he rated his pain a 10 over 10 intensity. He was given morphine and Zofran for pain. He refused urinalysis. He was unable to urinate. I advised him of the potential for delayed in diagnosis or worsening of his condition. No clinical suspicion for urinary retention.  He stated that he was anxious to leave and needed to

## 2020-09-24 NOTE — ED NOTES
Aware of need for urine specimen, unable to void     Demond Pinon RN  04/30/19 3359 Level Two Trauma Activation